# Patient Record
Sex: FEMALE | Race: WHITE | Employment: OTHER | URBAN - METROPOLITAN AREA
[De-identification: names, ages, dates, MRNs, and addresses within clinical notes are randomized per-mention and may not be internally consistent; named-entity substitution may affect disease eponyms.]

---

## 2017-05-23 ENCOUNTER — HOSPITAL ENCOUNTER (EMERGENCY)
Age: 62
Discharge: HOME | End: 2017-05-24
Attending: EMERGENCY MEDICINE | Admitting: EMERGENCY MEDICINE
Payer: COMMERCIAL

## 2017-05-23 ENCOUNTER — APPOINTMENT (OUTPATIENT)
Dept: CT IMAGING | Age: 62
End: 2017-05-23
Attending: EMERGENCY MEDICINE
Payer: COMMERCIAL

## 2017-05-23 ENCOUNTER — APPOINTMENT (OUTPATIENT)
Dept: GENERAL RADIOLOGY | Age: 62
End: 2017-05-23
Attending: EMERGENCY MEDICINE
Payer: COMMERCIAL

## 2017-05-23 DIAGNOSIS — R07.9 ACUTE CHEST PAIN: Primary | ICD-10-CM

## 2017-05-23 DIAGNOSIS — N39.0 URINARY TRACT INFECTION WITHOUT HEMATURIA, SITE UNSPECIFIED: ICD-10-CM

## 2017-05-23 LAB
ALBUMIN SERPL BCP-MCNC: 3.4 G/DL (ref 3.5–5)
ALBUMIN/GLOB SERPL: 0.9 {RATIO} (ref 1.1–2.2)
ALP SERPL-CCNC: 97 U/L (ref 45–117)
ALT SERPL-CCNC: 48 U/L (ref 12–78)
ANION GAP BLD CALC-SCNC: 10 MMOL/L (ref 5–15)
AST SERPL W P-5'-P-CCNC: 21 U/L (ref 15–37)
BASOPHILS # BLD AUTO: 0 K/UL (ref 0–0.1)
BASOPHILS # BLD: 0 % (ref 0–1)
BILIRUB SERPL-MCNC: 0.5 MG/DL (ref 0.2–1)
BUN SERPL-MCNC: 12 MG/DL (ref 6–20)
BUN/CREAT SERPL: 13 (ref 12–20)
CALCIUM SERPL-MCNC: 9.5 MG/DL (ref 8.5–10.1)
CHLORIDE SERPL-SCNC: 106 MMOL/L (ref 97–108)
CK MB CFR SERPL CALC: NORMAL % (ref 0–2.5)
CK MB SERPL-MCNC: <1 NG/ML (ref 5–25)
CK SERPL-CCNC: 61 U/L (ref 26–192)
CO2 SERPL-SCNC: 22 MMOL/L (ref 21–32)
CREAT SERPL-MCNC: 0.93 MG/DL (ref 0.55–1.02)
D DIMER PPP FEU-MCNC: 0.72 MG/L FEU (ref 0–0.65)
EOSINOPHIL # BLD: 0.2 K/UL (ref 0–0.4)
EOSINOPHIL NFR BLD: 1 % (ref 0–7)
ERYTHROCYTE [DISTWIDTH] IN BLOOD BY AUTOMATED COUNT: 13.9 % (ref 11.5–14.5)
GLOBULIN SER CALC-MCNC: 3.9 G/DL (ref 2–4)
GLUCOSE SERPL-MCNC: 141 MG/DL (ref 65–100)
HCT VFR BLD AUTO: 43.7 % (ref 35–47)
HGB BLD-MCNC: 14.4 G/DL (ref 11.5–16)
LYMPHOCYTES # BLD AUTO: 7 % (ref 12–49)
LYMPHOCYTES # BLD: 1.3 K/UL (ref 0.8–3.5)
MCH RBC QN AUTO: 28.5 PG (ref 26–34)
MCHC RBC AUTO-ENTMCNC: 33 G/DL (ref 30–36.5)
MCV RBC AUTO: 86.4 FL (ref 80–99)
MONOCYTES # BLD: 0.3 K/UL (ref 0–1)
MONOCYTES NFR BLD AUTO: 2 % (ref 5–13)
NEUTS SEG # BLD: 16.2 K/UL (ref 1.8–8)
NEUTS SEG NFR BLD AUTO: 90 % (ref 32–75)
PLATELET # BLD AUTO: 228 K/UL (ref 150–400)
POTASSIUM SERPL-SCNC: 4.2 MMOL/L (ref 3.5–5.1)
PROT SERPL-MCNC: 7.3 G/DL (ref 6.4–8.2)
RBC # BLD AUTO: 5.06 M/UL (ref 3.8–5.2)
SODIUM SERPL-SCNC: 138 MMOL/L (ref 136–145)
TROPONIN I SERPL-MCNC: <0.04 NG/ML
WBC # BLD AUTO: 18 K/UL (ref 3.6–11)

## 2017-05-23 PROCEDURE — 80053 COMPREHEN METABOLIC PANEL: CPT | Performed by: EMERGENCY MEDICINE

## 2017-05-23 PROCEDURE — 74011250636 HC RX REV CODE- 250/636: Performed by: EMERGENCY MEDICINE

## 2017-05-23 PROCEDURE — 84484 ASSAY OF TROPONIN QUANT: CPT | Performed by: EMERGENCY MEDICINE

## 2017-05-23 PROCEDURE — 99285 EMERGENCY DEPT VISIT HI MDM: CPT

## 2017-05-23 PROCEDURE — 74011250637 HC RX REV CODE- 250/637: Performed by: EMERGENCY MEDICINE

## 2017-05-23 PROCEDURE — 85025 COMPLETE CBC W/AUTO DIFF WBC: CPT | Performed by: EMERGENCY MEDICINE

## 2017-05-23 PROCEDURE — 85379 FIBRIN DEGRADATION QUANT: CPT | Performed by: EMERGENCY MEDICINE

## 2017-05-23 PROCEDURE — 96361 HYDRATE IV INFUSION ADD-ON: CPT

## 2017-05-23 PROCEDURE — 82550 ASSAY OF CK (CPK): CPT | Performed by: EMERGENCY MEDICINE

## 2017-05-23 PROCEDURE — 71010 XR CHEST PORT: CPT

## 2017-05-23 PROCEDURE — 96375 TX/PRO/DX INJ NEW DRUG ADDON: CPT

## 2017-05-23 PROCEDURE — 36415 COLL VENOUS BLD VENIPUNCTURE: CPT | Performed by: EMERGENCY MEDICINE

## 2017-05-23 PROCEDURE — 93005 ELECTROCARDIOGRAM TRACING: CPT

## 2017-05-23 PROCEDURE — 96374 THER/PROPH/DIAG INJ IV PUSH: CPT

## 2017-05-23 RX ORDER — ALBUTEROL SULFATE 90 UG/1
1 AEROSOL, METERED RESPIRATORY (INHALATION)
COMMUNITY

## 2017-05-23 RX ORDER — METFORMIN HYDROCHLORIDE 1000 MG/1
1000 TABLET ORAL 2 TIMES DAILY
COMMUNITY
End: 2022-09-30

## 2017-05-23 RX ORDER — PREDNISONE 10 MG/1
10 TABLET ORAL DAILY
COMMUNITY
End: 2022-09-30

## 2017-05-23 RX ORDER — BUDESONIDE AND FORMOTEROL FUMARATE DIHYDRATE 160; 4.5 UG/1; UG/1
2 AEROSOL RESPIRATORY (INHALATION) 2 TIMES DAILY
COMMUNITY
End: 2022-09-30

## 2017-05-23 RX ORDER — GUAIFENESIN 100 MG/5ML
81 LIQUID (ML) ORAL DAILY
COMMUNITY

## 2017-05-23 RX ORDER — ATORVASTATIN CALCIUM 40 MG/1
40 TABLET, FILM COATED ORAL DAILY
COMMUNITY

## 2017-05-23 RX ORDER — MORPHINE SULFATE 2 MG/ML
4 INJECTION, SOLUTION INTRAMUSCULAR; INTRAVENOUS
Status: COMPLETED | OUTPATIENT
Start: 2017-05-23 | End: 2017-05-23

## 2017-05-23 RX ORDER — DIAZEPAM 5 MG/1
5 TABLET ORAL
Status: COMPLETED | OUTPATIENT
Start: 2017-05-23 | End: 2017-05-23

## 2017-05-23 RX ORDER — ONDANSETRON 2 MG/ML
4 INJECTION INTRAMUSCULAR; INTRAVENOUS
Status: COMPLETED | OUTPATIENT
Start: 2017-05-23 | End: 2017-05-23

## 2017-05-23 RX ORDER — IPRATROPIUM BROMIDE AND ALBUTEROL SULFATE 2.5; .5 MG/3ML; MG/3ML
3 SOLUTION RESPIRATORY (INHALATION)
COMMUNITY
End: 2022-09-30

## 2017-05-23 RX ORDER — SODIUM CHLORIDE 0.9 % (FLUSH) 0.9 %
10 SYRINGE (ML) INJECTION
Status: DISCONTINUED | OUTPATIENT
Start: 2017-05-23 | End: 2017-05-24

## 2017-05-23 RX ORDER — OMEPRAZOLE 20 MG/1
20 CAPSULE, DELAYED RELEASE ORAL DAILY
COMMUNITY

## 2017-05-23 RX ORDER — SODIUM CHLORIDE 9 MG/ML
50 INJECTION, SOLUTION INTRAVENOUS
Status: DISCONTINUED | OUTPATIENT
Start: 2017-05-23 | End: 2017-05-24

## 2017-05-23 RX ORDER — METOPROLOL TARTRATE 25 MG/1
25 TABLET, FILM COATED ORAL 2 TIMES DAILY
COMMUNITY

## 2017-05-23 RX ADMIN — Medication 4 MG: at 22:00

## 2017-05-23 RX ADMIN — DIAZEPAM 5 MG: 5 TABLET ORAL at 21:24

## 2017-05-23 RX ADMIN — ONDANSETRON HYDROCHLORIDE 4 MG: 2 INJECTION, SOLUTION INTRAMUSCULAR; INTRAVENOUS at 22:00

## 2017-05-23 RX ADMIN — SODIUM CHLORIDE 1000 ML: 900 INJECTION, SOLUTION INTRAVENOUS at 22:00

## 2017-05-24 ENCOUNTER — APPOINTMENT (OUTPATIENT)
Dept: NUCLEAR MEDICINE | Age: 62
End: 2017-05-24
Attending: EMERGENCY MEDICINE
Payer: COMMERCIAL

## 2017-05-24 VITALS
HEART RATE: 81 BPM | BODY MASS INDEX: 42.69 KG/M2 | OXYGEN SATURATION: 95 % | HEIGHT: 62 IN | DIASTOLIC BLOOD PRESSURE: 70 MMHG | SYSTOLIC BLOOD PRESSURE: 126 MMHG | WEIGHT: 232 LBS | RESPIRATION RATE: 20 BRPM | TEMPERATURE: 97.8 F

## 2017-05-24 LAB
APPEARANCE UR: ABNORMAL
ATRIAL RATE: 115 BPM
BACTERIA URNS QL MICRO: ABNORMAL /HPF
BILIRUB UR QL: NEGATIVE
CALCULATED P AXIS, ECG09: 36 DEGREES
CALCULATED R AXIS, ECG10: -35 DEGREES
CALCULATED T AXIS, ECG11: 84 DEGREES
COLOR UR: ABNORMAL
DIAGNOSIS, 93000: NORMAL
EPITH CASTS URNS QL MICRO: ABNORMAL /LPF
GLUCOSE UR STRIP.AUTO-MCNC: NEGATIVE MG/DL
HGB UR QL STRIP: ABNORMAL
KETONES UR QL STRIP.AUTO: NEGATIVE MG/DL
LEUKOCYTE ESTERASE UR QL STRIP.AUTO: ABNORMAL
NITRITE UR QL STRIP.AUTO: POSITIVE
P-R INTERVAL, ECG05: 152 MS
PH UR STRIP: 5 [PH] (ref 5–8)
PROT UR STRIP-MCNC: 100 MG/DL
Q-T INTERVAL, ECG07: 316 MS
QRS DURATION, ECG06: 62 MS
QTC CALCULATION (BEZET), ECG08: 437 MS
RBC #/AREA URNS HPF: ABNORMAL /HPF (ref 0–5)
SP GR UR REFRACTOMETRY: 1.01 (ref 1–1.03)
TROPONIN I SERPL-MCNC: 0.04 NG/ML
UROBILINOGEN UR QL STRIP.AUTO: 0.2 EU/DL (ref 0.2–1)
VENTRICULAR RATE, ECG03: 115 BPM
WBC URNS QL MICRO: >100 /HPF (ref 0–4)

## 2017-05-24 PROCEDURE — 81001 URINALYSIS AUTO W/SCOPE: CPT | Performed by: EMERGENCY MEDICINE

## 2017-05-24 PROCEDURE — 84484 ASSAY OF TROPONIN QUANT: CPT | Performed by: EMERGENCY MEDICINE

## 2017-05-24 PROCEDURE — A9558 XE133 XENON 10MCI: HCPCS

## 2017-05-24 PROCEDURE — 74011250636 HC RX REV CODE- 250/636

## 2017-05-24 RX ORDER — CEPHALEXIN 250 MG/1
500 CAPSULE ORAL
Status: DISCONTINUED | OUTPATIENT
Start: 2017-05-24 | End: 2017-05-24 | Stop reason: HOSPADM

## 2017-05-24 RX ORDER — CEPHALEXIN 500 MG/1
500 CAPSULE ORAL 4 TIMES DAILY
Qty: 28 CAP | Refills: 0 | Status: SHIPPED
Start: 2017-05-24 | End: 2017-05-31

## 2017-05-24 RX ORDER — MORPHINE SULFATE 2 MG/ML
INJECTION, SOLUTION INTRAMUSCULAR; INTRAVENOUS
Status: DISCONTINUED
Start: 2017-05-24 | End: 2017-05-24 | Stop reason: HOSPADM

## 2017-05-24 RX ORDER — HYDROCODONE BITARTRATE AND ACETAMINOPHEN 7.5; 325 MG/1; MG/1
1 TABLET ORAL
Qty: 20 TAB | Refills: 0 | Status: SHIPPED
Start: 2017-05-24 | End: 2022-09-30

## 2017-05-24 RX ORDER — CEPHALEXIN 250 MG/1
CAPSULE ORAL
Status: DISCONTINUED
Start: 2017-05-24 | End: 2017-05-24 | Stop reason: HOSPADM

## 2017-05-24 NOTE — ED NOTES
Unable to obtain peripheral IV in antecubital area. Patient limited to 1 arm due to history of right radical mastectomy. Dr. Lang Gone notified.

## 2017-05-24 NOTE — ED TRIAGE NOTES
Chief Complaint: Back pain/Chest pain/Shortness of breath  Patient states \"was watching TV and started experiencing back pain between shoulder blades. Associated with shortness of breath. \"  \"Pressure on EMS arrival. Like someone sitting on chest.  Given sublingual nitroglycerin that eased the back pain but the chest pain remains. \"  Onset 2000  Pt arrived via [address]    Associated with nausea after took 2 aspirin prior to arrival.    EMS gave 2 additional aspirin and 3 sublingual nitroglycerin. History of MI x 1 and COPD.

## 2017-05-24 NOTE — ED PROVIDER NOTES
HPI Comments: Oswaldo Lewis is a 60 y.o. female s/p stent placement following an MI in 2010 with PMHx of COPD / CAD / breast CA / DM who presents via EMS to the ED c/o acute onset of diffuse back pain x 8:00 PM tonight while watching television. Pt complains of associated tremors, pressure-like chest pain, SOB, and nausea. She notes that her back pain began abruptly and her other symptoms followed shortly after onset of her back pain. Per EMS, pt took 2 tablets of ASA prior to EMS arrival and was given 2 more tablets en route to the ED. EMS states that the pt was also given a total of 3 nitroglycerin tablets en route. Pt states that the nitroglycerin tablets partially alleviated her back pain but did not reduce her chest pain. She notes that she experienced CP and L arm pain during her MI in 2010. Pt specifically denies L arm pain, wheezing, leg swelling, recent travel, hormone therapy, vomiting, or hx of clots. Social hx: - Tobacco use (former - 2011), + EtOH use (2 beers per night), - Illicit drug use    PCP: Dr. Rosie Cornelius  Cardiologist: Dr. Frieda Knight    There are no other complaints, changes or physical findings at this time. The history is provided by the patient and the EMS personnel. No  was used. Past Medical History:   Diagnosis Date    CAD (coronary artery disease)     Cancer ([address]     breast cancer with right radical mastectomy 2008    Chronic obstructive pulmonary disease ([address]     Diabetes ([address]     Gastrointestinal disorder     heart burn    Sleep disorder     uses CPAP at night       Past Surgical History:   Procedure Laterality Date    BREAST SURGERY PROCEDURE UNLISTED      right radical mastectomy    CARDIAC SURG PROCEDURE UNLIST      1 cardiac stent 2010         History reviewed. No pertinent family history.     Social History     Social History    Marital status:      Spouse name: N/A    Number of children: N/A    Years of education: N/A     Occupational History    Not on file. Social History Main Topics    Smoking status: Former Smoker     Quit date: 1/1/2011    Smokeless tobacco: Not on file    Alcohol use Yes      Comment: 2 beers per night    Drug use: No    Sexual activity: Not on file     Other Topics Concern    Not on file     Social History Narrative    No narrative on file         ALLERGIES: Codeine    Review of Systems   Constitutional: Negative for activity change, chills and fever. HENT: Negative for congestion and sore throat. Eyes: Negative for pain and redness. Respiratory: Positive for shortness of breath. Negative for cough, chest tightness and wheezing. Cardiovascular: Positive for chest pain. Negative for palpitations and leg swelling. Gastrointestinal: Positive for nausea. Negative for abdominal pain, diarrhea and vomiting. Genitourinary: Negative for dysuria, frequency and urgency. Musculoskeletal: Positive for back pain. Negative for neck pain. - L arm pain   Skin: Negative for rash. Neurological: Positive for tremors. Negative for syncope, light-headedness and headaches. Psychiatric/Behavioral: Negative for confusion. All other systems reviewed and are negative. Patient Vitals for the past 12 hrs:   Temp Pulse Resp BP SpO2   05/24/17 0300 - 81 22 126/70 95 %   05/24/17 0230 - 82 17 126/65 99 %   05/24/17 0200 - 84 25 112/61 97 %   05/24/17 0030 - 96 18 108/65 95 %   05/24/17 0000 - 98 19 111/72 97 %   05/23/17 2330 - (!) 101 17 104/54 96 %   05/23/17 2300 - 99 14 96/59 98 %   05/23/17 2230 - 98 13 108/57 97 %   05/23/17 2215 - 98 15 111/61 97 %   05/23/17 2145 - - - - 94 %   05/23/17 2130 - (!) 108 20 - 99 %   05/23/17 2100 - (!) 106 24 140/56 98 %   05/23/17 2045 99.9 °F (37.7 °C) (!) 116 19 122/59 98 %       Physical Exam   Constitutional: She is oriented to person, place, and time. She appears well-developed and well-nourished. No distress.    Obese   HENT:   Head: Normocephalic. Nose: Nose normal.   Mouth/Throat: Oropharynx is clear and moist. No oropharyngeal exudate. Eyes: Conjunctivae are normal. Pupils are equal, round, and reactive to light. No scleral icterus. Neck: Normal range of motion. Neck supple. No JVD present. No tracheal deviation present. No thyromegaly present. Cardiovascular: Regular rhythm and intact distal pulses. Exam reveals no gallop and no friction rub. No murmur heard. Tachycardic   Pulmonary/Chest: Effort normal and breath sounds normal. No stridor. No respiratory distress. She has no wheezes. She has no rales. Abdominal: Soft. Bowel sounds are normal. She exhibits no distension. There is no tenderness. There is no rebound and no guarding. Musculoskeletal: Normal range of motion. She exhibits no edema. Lymphadenopathy:     She has no cervical adenopathy. Neurological: She is alert and oriented to person, place, and time. No cranial nerve deficit. She exhibits normal muscle tone. Coordination normal.   Skin: Skin is warm and dry. No rash noted. She is not diaphoretic. No erythema. Psychiatric: She has a normal mood and affect. Her behavior is normal.   Nursing note and vitals reviewed. MDM  Number of Diagnoses or Management Options  Acute chest pain:   Urinary tract infection without hematuria, site unspecified:   Diagnosis management comments: DDx: ACS, COPD, anxiety       Amount and/or Complexity of Data Reviewed  Clinical lab tests: ordered and reviewed  Tests in the radiology section of CPT®: ordered and reviewed  Tests in the medicine section of CPT®: ordered and reviewed  Obtain history from someone other than the patient: yes (EMS personnel)  Review and summarize past medical records: yes  Independent visualization of images, tracings, or specimens: yes    Patient Progress  Patient progress: stable    ED Course     Procedures     ED EKG interpretation: 20:38  Rhythm: sinus tachycardia; and regular .  Rate (approx.): 115; Axis: left axis deviation; VA Interval: normal; QRS interval: normal ; ST/T wave: non-specific changes; This EKG was interpreted by Jarod Gavin MD,ED Provider. SIGN OUT:  10:08 PM  Patient's presentation, labs/imaging and plan of care was reviewed with Gregor Sicard, MD as part of sign out. They will dispo the pt as seen best fit as part of the plan discussed with the patient. Gregor Sicard, MD's assistance in completion of this plan is greatly appreciated but it should be noted that I will be the provider of record for this patient. Jarod Gavin MD    This note is prepared by Josué Garcia, acting as Scribe for Jarod Gavin MD.    Jarod Gavin MD: The scribe's documentation has been prepared under my direction and personally reviewed by me in its entirety. I confirm that the note above accurately reflects all work, treatment, procedures, and medical decision making performed by me. PROGRESS NOTE:  11:34 PM  Pt has been re-evaluated. The pt was unable to have a CT done noting the pt was unable to get adequate IV access. Will obtain VQ scan at this time. Written by Blair Cain, ED Scribe, as dictated by Gregor Sicard, MD.     PROGRESS NOTE:  2:01 AM  Pt has been re-evaluated. The pt will get Keflex for her UTI and will be treated with Morphine for her pain at this time. Written by Blair Cain, ED Scribe, as dictated by Gregor Sicard, MD.     Pt noted to receive care in the ED during monthly downtime. Imaging and lab work may not be readily available in EMR. Hard copy print outs of labs have been reviewed, imaging results (via \"sticky notes\" from Radiologist) have also been reviewed. Gregor Sicard, MD     Progress note:  Pt noted to be feeling better, ready for discharge. Discussed lab and imaging findings with pt and/or family. Will follow up as instructed. All questions have been answered, pt voiced understanding and agreement with plan.      If narcotics were prescribed, pt was advised not to drive or operate heavy machinery. If abx were prescribed, pt advised that diarrhea and rash are possible side effects of the medications. Specific return precautions provided as well as instructions to return to the ED should sx worsen at any time. Uma Pierre MD      LABORATORY TESTS:  Recent Results (from the past 12 hour(s))   EKG, 12 LEAD, INITIAL    Collection Time: 05/23/17  8:38 PM   Result Value Ref Range    Ventricular Rate 115 BPM    Atrial Rate 115 BPM    P-R Interval 152 ms    QRS Duration 62 ms    Q-T Interval 316 ms    QTC Calculation (Bezet) 437 ms    Calculated P Axis 36 degrees    Calculated R Axis -35 degrees    Calculated T Axis 84 degrees    Diagnosis       Sinus tachycardia  Left axis deviation  Cannot rule out Anterior infarct , age undetermined  Abnormal ECG  No previous ECGs available     CBC WITH AUTOMATED DIFF    Collection Time: 05/23/17  9:23 PM   Result Value Ref Range    WBC 18.0 (H) 3.6 - 11.0 K/uL    RBC 5.06 3.80 - 5.20 M/uL    HGB 14.4 11.5 - 16.0 g/dL    HCT 43.7 35.0 - 47.0 %    MCV 86.4 80.0 - 99.0 FL    MCH 28.5 26.0 - 34.0 PG    MCHC 33.0 30.0 - 36.5 g/dL    RDW 13.9 11.5 - 14.5 %    PLATELET 272 012 - 232 K/uL    NEUTROPHILS 90 (H) 32 - 75 %    LYMPHOCYTES 7 (L) 12 - 49 %    MONOCYTES 2 (L) 5 - 13 %    EOSINOPHILS 1 0 - 7 %    BASOPHILS 0 0 - 1 %    ABS. NEUTROPHILS 16.2 (H) 1.8 - 8.0 K/UL    ABS. LYMPHOCYTES 1.3 0.8 - 3.5 K/UL    ABS. MONOCYTES 0.3 0.0 - 1.0 K/UL    ABS. EOSINOPHILS 0.2 0.0 - 0.4 K/UL    ABS.  BASOPHILS 0.0 0.0 - 0.1 K/UL   METABOLIC PANEL, COMPREHENSIVE    Collection Time: 05/23/17  9:23 PM   Result Value Ref Range    Sodium 138 136 - 145 mmol/L    Potassium 4.2 3.5 - 5.1 mmol/L    Chloride 106 97 - 108 mmol/L    CO2 22 21 - 32 mmol/L    Anion gap 10 5 - 15 mmol/L    Glucose 141 (H) 65 - 100 mg/dL    BUN 12 6 - 20 MG/DL    Creatinine 0.93 0.55 - 1.02 MG/DL    BUN/Creatinine ratio 13 12 - 20      GFR est AA >60 >60 ml/min/1.73m2    GFR est non-AA >60 >60 ml/min/1.73m2    Calcium 9.5 8.5 - 10.1 MG/DL    Bilirubin, total 0.5 0.2 - 1.0 MG/DL    ALT (SGPT) 48 12 - 78 U/L    AST (SGOT) 21 15 - 37 U/L    Alk. phosphatase 97 45 - 117 U/L    Protein, total 7.3 6.4 - 8.2 g/dL    Albumin 3.4 (L) 3.5 - 5.0 g/dL    Globulin 3.9 2.0 - 4.0 g/dL    A-G Ratio 0.9 (L) 1.1 - 2.2     CK W/ CKMB & INDEX    Collection Time: 05/23/17  9:23 PM   Result Value Ref Range    CK 61 26 - 192 U/L    CK - MB <1.0 <3.6 NG/ML    CK-MB Index Cannot be calulated 0 - 2.5     TROPONIN I    Collection Time: 05/23/17  9:23 PM   Result Value Ref Range    Troponin-I, Qt. <0.04 <0.05 ng/mL   D DIMER    Collection Time: 05/23/17  9:57 PM   Result Value Ref Range    D-dimer 0.72 (H) 0.00 - 0.65 mg/L FEU   TROPONIN I    Collection Time: 05/24/17 12:02 AM   Result Value Ref Range    Troponin-I, Qt. 0.04 <0.05 ng/mL   URINALYSIS W/MICROSCOPIC    Collection Time: 05/24/17 12:11 AM   Result Value Ref Range    Color YELLOW/STRAW      Appearance TURBID (A) CLEAR      Specific gravity 1.013 1.003 - 1.030      pH (UA) 5.0 5.0 - 8.0      Protein 100 (A) NEG mg/dL    Glucose NEGATIVE  NEG mg/dL    Ketone NEGATIVE  NEG mg/dL    Bilirubin NEGATIVE  NEG      Blood LARGE (A) NEG      Urobilinogen 0.2 0.2 - 1.0 EU/dL    Nitrites POSITIVE (A) NEG      Leukocyte Esterase LARGE (A) NEG      WBC >100 (H) 0 - 4 /hpf    RBC 20-50 0 - 5 /hpf    Epithelial cells FEW FEW /lpf    Bacteria 1+ (A) NEG /hpf       IMAGING RESULTS:              NM LUNG VENT/PERF    (Preliminary results from radiology sticker)  :\"very low probability for acute pulmonary embolus. \"     CXR Results  (Last 48 hours)               05/23/17 2114  XR CHEST PORT Final result    Impression:  IMPRESSION:   No acute finding       Narrative:  INDICATION: Lower mid back and chest pain with tremors       COMPARISON: None       A single frontal view was obtained. The time of this study is 2058 hours. Lungs   are clear.  Heart size is at the upper limits of normal for this type of   projection. Mediastinal shadows are normal.. MEDICATIONS GIVEN:  Medications   0.9% sodium chloride infusion (not administered)   iopamidol (ISOVUE-370) 76 % injection 100 mL (not administered)   sodium chloride (NS) flush 10 mL (not administered)   diazePAM (VALIUM) tablet 5 mg (5 mg Oral Given 5/23/17 2124)   morphine injection 4 mg (4 mg IntraVENous Given 5/23/17 2200)   ondansetron (ZOFRAN) injection 4 mg (4 mg IntraVENous Given 5/23/17 2200)   sodium chloride 0.9 % bolus infusion 1,000 mL (1,000 mL IntraVENous New Bag 5/23/17 2200)       IMPRESSION:  1. Acute chest pain    2. Urinary tract infection without hematuria, site unspecified        PLAN:  1. Current Discharge Medication List        2. Follow-up Information     Follow up With Details Comments 81 Julianne Darnell MD Schedule an appointment as soon as possible for a visit in 1 day  [address],  [address] [facility] [address]  [phone]      [facility] EMERGENCY DEPT Go in 1 day If symptoms worsen [address]  [address]   [address] [address] [place]        3. Return to ED if worse  Discharge Note:  2:50 AM  The patient is ready for discharge. The patient's signs, symptoms, diagnosis, and discharge instruction have been discussed and the patient has conveyed their understanding. The patient is to follow up as recommended or return to the ER should their symptoms worsen. Plan has been discussed and the patient is in agreement. Written by Lyndon Cain ED Scribe, as dictated by Vernadine Leyden, MD     Attestation: This note is prepared by Brooklynn Bloom. Ant, acting as Scribe for Vernadine Leyden, MD.      Vernadine Leyden, MD: The scribe's documentation has been prepared under my direction and personally reviewed by me in its entirety.  I confirm that the note above accurately reflects all work, treatment, procedures, and medical decision making performed by me. This note will not be viewable in [address]

## 2017-05-24 NOTE — DISCHARGE INSTRUCTIONS
Urinary Tract Infection in Women: Care Instructions  Your Care Instructions    A urinary tract infection, or UTI, is a general term for an infection anywhere between the kidneys and the urethra (where urine comes out). Most UTIs are bladder infections. They often cause pain or burning when you urinate. UTIs are caused by bacteria and can be cured with antibiotics. Be sure to complete your treatment so that the infection goes away. Follow-up care is a key part of your treatment and safety. Be sure to make and go to all appointments, and call your doctor if you are having problems. It's also a good idea to know your test results and keep a list of the medicines you take. How can you care for yourself at home? · Take your antibiotics as directed. Do not stop taking them just because you feel better. You need to take the full course of antibiotics. · Drink extra water and other fluids for the next day or two. This may help wash out the bacteria that are causing the infection. (If you have kidney, heart, or liver disease and have to limit fluids, talk with your doctor before you increase your fluid intake.)  · Avoid drinks that are carbonated or have caffeine. They can irritate the bladder. · Urinate often. Try to empty your bladder each time. · To relieve pain, take a hot bath or lay a heating pad set on low over your lower belly or genital area. Never go to sleep with a heating pad in place. To prevent UTIs  · Drink plenty of water each day. This helps you urinate often, which clears bacteria from your system. (If you have kidney, heart, or liver disease and have to limit fluids, talk with your doctor before you increase your fluid intake.)  · Urinate when you need to. · Urinate right after you have sex. · Change sanitary pads often. · Avoid douches, bubble baths, feminine hygiene sprays, and other feminine hygiene products that have deodorants.   · After going to the bathroom, wipe from front to back.  When should you call for help? Call your doctor now or seek immediate medical care if:  · Symptoms such as fever, chills, nausea, or vomiting get worse or appear for the first time. · You have new pain in your back just below your rib cage. This is called flank pain. · There is new blood or pus in your urine. · You have any problems with your antibiotic medicine. Watch closely for changes in your health, and be sure to contact your doctor if:  · You are not getting better after taking an antibiotic for 2 days. · Your symptoms go away but then come back. Where can you learn more? Go to http://vivian-natalia.info/. Enter I203 in the search box to learn more about \"Urinary Tract Infection in Women: Care Instructions. \"  Current as of: November 28, 2016  Content Version: 11.2  © 8865-1993 [employer]. Care instructions adapted under license by [facility] (which disclaims liability or warranty for this information). If you have questions about a medical condition or this instruction, always ask your healthcare professional. [address] any warranty or liability for your use of this information. Chest Pain: Care Instructions  Your Care Instructions  There are many things that can cause chest pain. Some are not serious and will get better on their own in a few days. But some kinds of chest pain need more testing and treatment. Your doctor may have recommended a follow-up visit in the next 8 to 12 hours. If you are not getting better, you may need more tests or treatment. Even though your doctor has released you, you still need to watch for any problems. The doctor carefully checked you, but sometimes problems can develop later. If you have new symptoms or if your symptoms do not get better, get medical care right away.   If you have worse or different chest pain or pressure that lasts more than 5 minutes or you passed out (lost consciousness), call 911 or seek other emergency help right away. A medical visit is only one step in your treatment. Even if you feel better, you still need to do what your doctor recommends, such as going to all suggested follow-up appointments and taking medicines exactly as directed. This will help you recover and help prevent future problems. How can you care for yourself at home? · Rest until you feel better. · Take your medicine exactly as prescribed. Call your doctor if you think you are having a problem with your medicine. · Do not drive after taking a prescription pain medicine. When should you call for help? Call 911 if:  · You passed out (lost consciousness). · You have severe difficulty breathing. · You have symptoms of a heart attack. These may include:  ¨ Chest pain or pressure, or a strange feeling in your chest.  ¨ Sweating. ¨ Shortness of breath. ¨ Nausea or vomiting. ¨ Pain, pressure, or a strange feeling in your back, neck, jaw, or upper belly or in one or both shoulders or arms. ¨ Lightheadedness or sudden weakness. ¨ A fast or irregular heartbeat. After you call 911, the  may tell you to chew 1 adult-strength or 2 to 4 low-dose aspirin. Wait for an ambulance. Do not try to drive yourself. Call your doctor today if:  · You have any trouble breathing. · Your chest pain gets worse. · You are dizzy or lightheaded, or you feel like you may faint. · You are not getting better as expected. · You are having new or different chest pain. Where can you learn more? Go to http://vivian-natalia.info/. Enter A120 in the search box to learn more about \"Chest Pain: Care Instructions. \"  Current as of: May 27, 2016  Content Version: 11.2  © 7967-4535 [employer]. Care instructions adapted under license by [employer] (which disclaims liability or warranty for this information).  If you have questions about a medical condition or this instruction, always ask your healthcare professional. [place] [address] any warranty or liability for your use of this information.

## 2017-05-24 NOTE — ED NOTES
The documentation for this period is being entered following the guidelines as defined in the [facility] downtime policy by Xavier Osullivan RN. Downtime started at 0100. Please see downtime documentation for discharge and medications during downtime.

## 2017-05-24 NOTE — ED NOTES
Pain in chest alleviated. Pain in upper back 1/10 at this time after Morphine IV. No shortness of breath at this time.

## 2020-11-28 ENCOUNTER — HOSPITAL ENCOUNTER (INPATIENT)
Age: 65
LOS: 3 days | Discharge: HOME | DRG: 177 | End: 2020-12-02
Attending: EMERGENCY MEDICINE | Admitting: INTERNAL MEDICINE
Payer: MEDICARE

## 2020-11-28 ENCOUNTER — APPOINTMENT (OUTPATIENT)
Dept: GENERAL RADIOLOGY | Age: 65
DRG: 177 | End: 2020-11-28
Attending: EMERGENCY MEDICINE
Payer: MEDICARE

## 2020-11-28 DIAGNOSIS — U07.1 ACUTE RESPIRATORY FAILURE DUE TO COVID-19 (HCC): Primary | ICD-10-CM

## 2020-11-28 DIAGNOSIS — R91.8 BILATERAL PULMONARY INFILTRATES ON CHEST X-RAY: ICD-10-CM

## 2020-11-28 DIAGNOSIS — I10 ESSENTIAL HYPERTENSION: ICD-10-CM

## 2020-11-28 DIAGNOSIS — U07.1 LAB TEST POSITIVE FOR DETECTION OF COVID-19 VIRUS: ICD-10-CM

## 2020-11-28 DIAGNOSIS — Z86.000 H/O CARCINOMA IN SITU OF BREAST: ICD-10-CM

## 2020-11-28 DIAGNOSIS — J12.82 PNEUMONIA DUE TO COVID-19 VIRUS: ICD-10-CM

## 2020-11-28 DIAGNOSIS — U07.1 PNEUMONIA DUE TO COVID-19 VIRUS: ICD-10-CM

## 2020-11-28 DIAGNOSIS — R09.02 HYPOXIA: ICD-10-CM

## 2020-11-28 DIAGNOSIS — G47.33 OSA (OBSTRUCTIVE SLEEP APNEA): ICD-10-CM

## 2020-11-28 DIAGNOSIS — J96.00 ACUTE RESPIRATORY FAILURE DUE TO COVID-19 (HCC): Primary | ICD-10-CM

## 2020-11-28 LAB
ALBUMIN SERPL-MCNC: 2.9 G/DL (ref 3.5–5)
ALBUMIN/GLOB SERPL: 0.7 {RATIO} (ref 1.1–2.2)
ALP SERPL-CCNC: 102 U/L (ref 45–117)
ALT SERPL-CCNC: 55 U/L (ref 12–78)
ANION GAP SERPL CALC-SCNC: 9 MMOL/L (ref 5–15)
AST SERPL-CCNC: 49 U/L (ref 15–37)
BASOPHILS # BLD: 0 K/UL (ref 0–0.1)
BASOPHILS NFR BLD: 0 % (ref 0–1)
BILIRUB SERPL-MCNC: 0.5 MG/DL (ref 0.2–1)
BUN SERPL-MCNC: 13 MG/DL (ref 6–20)
BUN/CREAT SERPL: 11 (ref 12–20)
CALCIUM SERPL-MCNC: 8.9 MG/DL (ref 8.5–10.1)
CHLORIDE SERPL-SCNC: 103 MMOL/L (ref 97–108)
CO2 SERPL-SCNC: 23 MMOL/L (ref 21–32)
CREAT SERPL-MCNC: 1.16 MG/DL (ref 0.55–1.02)
DIFFERENTIAL METHOD BLD: ABNORMAL
EOSINOPHIL # BLD: 0 K/UL (ref 0–0.4)
EOSINOPHIL NFR BLD: 0 % (ref 0–7)
ERYTHROCYTE [DISTWIDTH] IN BLOOD BY AUTOMATED COUNT: 14.8 % (ref 11.5–14.5)
GLOBULIN SER CALC-MCNC: 4.1 G/DL (ref 2–4)
GLUCOSE SERPL-MCNC: 208 MG/DL (ref 65–100)
HCT VFR BLD AUTO: 47.3 % (ref 35–47)
HGB BLD-MCNC: 14.9 G/DL (ref 11.5–16)
IMM GRANULOCYTES # BLD AUTO: 0 K/UL (ref 0–0.04)
IMM GRANULOCYTES NFR BLD AUTO: 0 % (ref 0–0.5)
LYMPHOCYTES # BLD: 1.3 K/UL (ref 0.8–3.5)
LYMPHOCYTES NFR BLD: 17 % (ref 12–49)
MCH RBC QN AUTO: 26.5 PG (ref 26–34)
MCHC RBC AUTO-ENTMCNC: 31.5 G/DL (ref 30–36.5)
MCV RBC AUTO: 84 FL (ref 80–99)
MONOCYTES # BLD: 0.7 K/UL (ref 0–1)
MONOCYTES NFR BLD: 9 % (ref 5–13)
NEUTS SEG # BLD: 5.6 K/UL (ref 1.8–8)
NEUTS SEG NFR BLD: 74 % (ref 32–75)
NRBC # BLD: 0 K/UL (ref 0–0.01)
NRBC BLD-RTO: 0 PER 100 WBC
PLATELET # BLD AUTO: 162 K/UL (ref 150–400)
PMV BLD AUTO: 11.8 FL (ref 8.9–12.9)
POTASSIUM SERPL-SCNC: 3.5 MMOL/L (ref 3.5–5.1)
PROT SERPL-MCNC: 7 G/DL (ref 6.4–8.2)
RBC # BLD AUTO: 5.63 M/UL (ref 3.8–5.2)
SODIUM SERPL-SCNC: 135 MMOL/L (ref 136–145)
TROPONIN I SERPL-MCNC: <0.05 NG/ML
WBC # BLD AUTO: 7.7 K/UL (ref 3.6–11)

## 2020-11-28 PROCEDURE — 93005 ELECTROCARDIOGRAM TRACING: CPT

## 2020-11-28 PROCEDURE — 74011250636 HC RX REV CODE- 250/636: Performed by: EMERGENCY MEDICINE

## 2020-11-28 PROCEDURE — 84484 ASSAY OF TROPONIN QUANT: CPT

## 2020-11-28 PROCEDURE — 80053 COMPREHEN METABOLIC PANEL: CPT

## 2020-11-28 PROCEDURE — 74011250637 HC RX REV CODE- 250/637: Performed by: EMERGENCY MEDICINE

## 2020-11-28 PROCEDURE — 36415 COLL VENOUS BLD VENIPUNCTURE: CPT

## 2020-11-28 PROCEDURE — 71045 X-RAY EXAM CHEST 1 VIEW: CPT

## 2020-11-28 PROCEDURE — 85025 COMPLETE CBC W/AUTO DIFF WBC: CPT

## 2020-11-28 PROCEDURE — 99285 EMERGENCY DEPT VISIT HI MDM: CPT

## 2020-11-28 RX ORDER — BENZONATATE 100 MG/1
100 CAPSULE ORAL
Status: COMPLETED | OUTPATIENT
Start: 2020-11-28 | End: 2020-11-28

## 2020-11-28 RX ORDER — DOXYCYCLINE HYCLATE 100 MG
100 TABLET ORAL
Status: COMPLETED | OUTPATIENT
Start: 2020-11-28 | End: 2020-11-28

## 2020-11-28 RX ORDER — ALBUTEROL SULFATE 90 UG/1
4 AEROSOL, METERED RESPIRATORY (INHALATION)
Status: COMPLETED | OUTPATIENT
Start: 2020-11-28 | End: 2020-11-28

## 2020-11-28 RX ORDER — KETOROLAC TROMETHAMINE 30 MG/ML
30 INJECTION, SOLUTION INTRAMUSCULAR; INTRAVENOUS
Status: DISCONTINUED | OUTPATIENT
Start: 2020-11-28 | End: 2020-11-28

## 2020-11-28 RX ORDER — KETOROLAC TROMETHAMINE 30 MG/ML
15 INJECTION, SOLUTION INTRAMUSCULAR; INTRAVENOUS
Status: COMPLETED | OUTPATIENT
Start: 2020-11-28 | End: 2020-11-28

## 2020-11-28 RX ADMIN — ALBUTEROL SULFATE 4 PUFF: 90 AEROSOL, METERED RESPIRATORY (INHALATION) at 23:00

## 2020-11-28 RX ADMIN — KETOROLAC TROMETHAMINE 15 MG: 30 INJECTION, SOLUTION INTRAMUSCULAR; INTRAVENOUS at 22:16

## 2020-11-28 RX ADMIN — BENZONATATE 100 MG: 100 CAPSULE ORAL at 23:01

## 2020-11-28 RX ADMIN — DOXYCYCLINE HYCLATE 100 MG: 100 TABLET, COATED ORAL at 23:01

## 2020-11-29 ENCOUNTER — APPOINTMENT (OUTPATIENT)
Dept: CT IMAGING | Age: 65
DRG: 177 | End: 2020-11-29
Attending: EMERGENCY MEDICINE
Payer: MEDICARE

## 2020-11-29 PROBLEM — E78.5 HYPERLIPIDEMIA: Status: ACTIVE | Noted: 2020-11-29

## 2020-11-29 PROBLEM — I10 HTN (HYPERTENSION): Status: ACTIVE | Noted: 2020-11-29

## 2020-11-29 PROBLEM — J12.82 PNEUMONIA DUE TO COVID-19 VIRUS: Status: ACTIVE | Noted: 2020-11-29

## 2020-11-29 PROBLEM — E11.9 TYPE II DIABETES MELLITUS (HCC): Status: ACTIVE | Noted: 2020-11-29

## 2020-11-29 PROBLEM — K21.9 GERD (GASTROESOPHAGEAL REFLUX DISEASE): Status: ACTIVE | Noted: 2020-11-29

## 2020-11-29 PROBLEM — U07.1 PNEUMONIA DUE TO COVID-19 VIRUS: Status: ACTIVE | Noted: 2020-11-29

## 2020-11-29 PROBLEM — R09.02 HYPOXIA: Status: ACTIVE | Noted: 2020-11-29

## 2020-11-29 LAB
APTT PPP: 25.2 SEC (ref 22.1–31)
ATRIAL RATE: 93 BPM
CALCULATED P AXIS, ECG09: 52 DEGREES
CALCULATED R AXIS, ECG10: -22 DEGREES
CALCULATED T AXIS, ECG11: 45 DEGREES
COMMENT, HOLDF: NORMAL
COVID-19 RAPID TEST, COVR: DETECTED
CRP SERPL-MCNC: 5.45 MG/DL (ref 0–0.6)
DIAGNOSIS, 93000: NORMAL
FERRITIN SERPL-MCNC: 360 NG/ML (ref 8–252)
FERRITIN SERPL-MCNC: 368 NG/ML (ref 8–252)
FIBRINOGEN PPP-MCNC: 572 MG/DL (ref 200–475)
GLUCOSE BLD STRIP.AUTO-MCNC: 249 MG/DL (ref 65–100)
GLUCOSE BLD STRIP.AUTO-MCNC: 297 MG/DL (ref 65–100)
GLUCOSE BLD STRIP.AUTO-MCNC: 330 MG/DL (ref 65–100)
GLUCOSE BLD STRIP.AUTO-MCNC: 331 MG/DL (ref 65–100)
HEALTH STATUS, XMCV2T: ABNORMAL
INR PPP: 1.1 (ref 0.9–1.1)
LDH SERPL L TO P-CCNC: 329 U/L (ref 81–246)
P-R INTERVAL, ECG05: 156 MS
PROCALCITONIN SERPL-MCNC: <0.05 NG/ML
PROTHROMBIN TIME: 11 SEC (ref 9–11.1)
Q-T INTERVAL, ECG07: 368 MS
QRS DURATION, ECG06: 58 MS
QTC CALCULATION (BEZET), ECG08: 457 MS
SAMPLES BEING HELD,HOLD: NORMAL
SERVICE CMNT-IMP: ABNORMAL
SOURCE, COVRS: ABNORMAL
SPECIMEN SOURCE, FCOV2M: ABNORMAL
SPECIMEN TYPE, XMCV1T: ABNORMAL
THERAPEUTIC RANGE,PTTT: NORMAL SECS (ref 58–77)
VENTRICULAR RATE, ECG03: 93 BPM

## 2020-11-29 PROCEDURE — 74011000258 HC RX REV CODE- 258: Performed by: GENERAL ACUTE CARE HOSPITAL

## 2020-11-29 PROCEDURE — 82728 ASSAY OF FERRITIN: CPT

## 2020-11-29 PROCEDURE — 74011000258 HC RX REV CODE- 258: Performed by: EMERGENCY MEDICINE

## 2020-11-29 PROCEDURE — 74011250637 HC RX REV CODE- 250/637: Performed by: INTERNAL MEDICINE

## 2020-11-29 PROCEDURE — 85730 THROMBOPLASTIN TIME PARTIAL: CPT

## 2020-11-29 PROCEDURE — 74011000636 HC RX REV CODE- 636: Performed by: EMERGENCY MEDICINE

## 2020-11-29 PROCEDURE — 74011250637 HC RX REV CODE- 250/637: Performed by: GENERAL ACUTE CARE HOSPITAL

## 2020-11-29 PROCEDURE — 77010033678 HC OXYGEN DAILY

## 2020-11-29 PROCEDURE — 85384 FIBRINOGEN ACTIVITY: CPT

## 2020-11-29 PROCEDURE — 83615 LACTATE (LD) (LDH) ENZYME: CPT

## 2020-11-29 PROCEDURE — 65660000000 HC RM CCU STEPDOWN

## 2020-11-29 PROCEDURE — 87635 SARS-COV-2 COVID-19 AMP PRB: CPT

## 2020-11-29 PROCEDURE — 74011250636 HC RX REV CODE- 250/636: Performed by: EMERGENCY MEDICINE

## 2020-11-29 PROCEDURE — 74011250636 HC RX REV CODE- 250/636: Performed by: INTERNAL MEDICINE

## 2020-11-29 PROCEDURE — 86140 C-REACTIVE PROTEIN: CPT

## 2020-11-29 PROCEDURE — 94640 AIRWAY INHALATION TREATMENT: CPT

## 2020-11-29 PROCEDURE — 99223 1ST HOSP IP/OBS HIGH 75: CPT | Performed by: INTERNAL MEDICINE

## 2020-11-29 PROCEDURE — 82962 GLUCOSE BLOOD TEST: CPT

## 2020-11-29 PROCEDURE — 74011000250 HC RX REV CODE- 250: Performed by: GENERAL ACUTE CARE HOSPITAL

## 2020-11-29 PROCEDURE — 84145 PROCALCITONIN (PCT): CPT

## 2020-11-29 PROCEDURE — 71275 CT ANGIOGRAPHY CHEST: CPT

## 2020-11-29 PROCEDURE — 36415 COLL VENOUS BLD VENIPUNCTURE: CPT

## 2020-11-29 PROCEDURE — 74011636637 HC RX REV CODE- 636/637: Performed by: INTERNAL MEDICINE

## 2020-11-29 PROCEDURE — 94760 N-INVAS EAR/PLS OXIMETRY 1: CPT

## 2020-11-29 PROCEDURE — 74011250636 HC RX REV CODE- 250/636: Performed by: GENERAL ACUTE CARE HOSPITAL

## 2020-11-29 PROCEDURE — XW033E5 INTRODUCTION OF REMDESIVIR ANTI-INFECTIVE INTO PERIPHERAL VEIN, PERCUTANEOUS APPROACH, NEW TECHNOLOGY GROUP 5: ICD-10-PCS | Performed by: GENERAL ACUTE CARE HOSPITAL

## 2020-11-29 PROCEDURE — 85610 PROTHROMBIN TIME: CPT

## 2020-11-29 PROCEDURE — 87040 BLOOD CULTURE FOR BACTERIA: CPT

## 2020-11-29 RX ORDER — ACETAMINOPHEN 325 MG/1
650 TABLET ORAL
Status: DISCONTINUED | OUTPATIENT
Start: 2020-11-29 | End: 2020-12-02 | Stop reason: HOSPADM

## 2020-11-29 RX ORDER — ACETAMINOPHEN 325 MG/1
650 TABLET ORAL
Status: DISCONTINUED | OUTPATIENT
Start: 2020-11-29 | End: 2020-11-29 | Stop reason: SDUPTHER

## 2020-11-29 RX ORDER — ASCORBIC ACID 500 MG
1000 TABLET ORAL 2 TIMES DAILY
Status: DISCONTINUED | OUTPATIENT
Start: 2020-11-29 | End: 2020-12-02 | Stop reason: HOSPADM

## 2020-11-29 RX ORDER — SODIUM CHLORIDE 0.9 % (FLUSH) 0.9 %
5-40 SYRINGE (ML) INJECTION EVERY 8 HOURS
Status: DISCONTINUED | OUTPATIENT
Start: 2020-11-29 | End: 2020-12-02 | Stop reason: HOSPADM

## 2020-11-29 RX ORDER — FENTANYL CITRATE 50 UG/ML
25 INJECTION, SOLUTION INTRAMUSCULAR; INTRAVENOUS
Status: DISCONTINUED | OUTPATIENT
Start: 2020-11-29 | End: 2020-12-02 | Stop reason: HOSPADM

## 2020-11-29 RX ORDER — METOPROLOL TARTRATE 25 MG/1
25 TABLET, FILM COATED ORAL 2 TIMES DAILY
Status: DISCONTINUED | OUTPATIENT
Start: 2020-11-29 | End: 2020-12-02 | Stop reason: HOSPADM

## 2020-11-29 RX ORDER — OXYCODONE AND ACETAMINOPHEN 5; 325 MG/1; MG/1
1 TABLET ORAL
Status: DISCONTINUED | OUTPATIENT
Start: 2020-11-29 | End: 2020-12-02 | Stop reason: HOSPADM

## 2020-11-29 RX ORDER — BUDESONIDE AND FORMOTEROL FUMARATE DIHYDRATE 160; 4.5 UG/1; UG/1
2 AEROSOL RESPIRATORY (INHALATION)
Status: DISCONTINUED | OUTPATIENT
Start: 2020-11-29 | End: 2020-12-02 | Stop reason: HOSPADM

## 2020-11-29 RX ORDER — BENZONATATE 100 MG/1
200 CAPSULE ORAL
Status: DISCONTINUED | OUTPATIENT
Start: 2020-11-29 | End: 2020-12-02 | Stop reason: HOSPADM

## 2020-11-29 RX ORDER — POLYETHYLENE GLYCOL 3350 17 G/17G
17 POWDER, FOR SOLUTION ORAL DAILY PRN
Status: DISCONTINUED | OUTPATIENT
Start: 2020-11-29 | End: 2020-12-02 | Stop reason: HOSPADM

## 2020-11-29 RX ORDER — GUAIFENESIN 100 MG/5ML
81 LIQUID (ML) ORAL DAILY
Status: DISCONTINUED | OUTPATIENT
Start: 2020-11-29 | End: 2020-12-02 | Stop reason: HOSPADM

## 2020-11-29 RX ORDER — CODEINE PHOSPHATE AND GUAIFENESIN 10; 100 MG/5ML; MG/5ML
10 SOLUTION ORAL
Status: DISCONTINUED | OUTPATIENT
Start: 2020-11-29 | End: 2020-11-29

## 2020-11-29 RX ORDER — INSULIN LISPRO 100 [IU]/ML
INJECTION, SOLUTION INTRAVENOUS; SUBCUTANEOUS
Status: DISCONTINUED | OUTPATIENT
Start: 2020-11-29 | End: 2020-12-02 | Stop reason: HOSPADM

## 2020-11-29 RX ORDER — SODIUM CHLORIDE 0.9 % (FLUSH) 0.9 %
5-40 SYRINGE (ML) INJECTION AS NEEDED
Status: DISCONTINUED | OUTPATIENT
Start: 2020-11-29 | End: 2020-12-02 | Stop reason: HOSPADM

## 2020-11-29 RX ORDER — SODIUM CHLORIDE 0.9 % (FLUSH) 0.9 %
10 SYRINGE (ML) INJECTION
Status: COMPLETED | OUTPATIENT
Start: 2020-11-29 | End: 2020-11-29

## 2020-11-29 RX ORDER — ZINC SULFATE 50(220)MG
220 CAPSULE ORAL DAILY
Status: DISCONTINUED | OUTPATIENT
Start: 2020-11-29 | End: 2020-12-02 | Stop reason: HOSPADM

## 2020-11-29 RX ORDER — ACETAMINOPHEN 650 MG/1
650 SUPPOSITORY RECTAL
Status: DISCONTINUED | OUTPATIENT
Start: 2020-11-29 | End: 2020-12-02 | Stop reason: HOSPADM

## 2020-11-29 RX ORDER — ENOXAPARIN SODIUM 100 MG/ML
40 INJECTION SUBCUTANEOUS EVERY 12 HOURS
Status: DISCONTINUED | OUTPATIENT
Start: 2020-11-29 | End: 2020-12-02 | Stop reason: HOSPADM

## 2020-11-29 RX ORDER — GUAIFENESIN 600 MG/1
600 TABLET, EXTENDED RELEASE ORAL EVERY 12 HOURS
Status: DISCONTINUED | OUTPATIENT
Start: 2020-11-29 | End: 2020-12-02 | Stop reason: HOSPADM

## 2020-11-29 RX ORDER — PROMETHAZINE HYDROCHLORIDE 25 MG/1
12.5 TABLET ORAL
Status: DISCONTINUED | OUTPATIENT
Start: 2020-11-29 | End: 2020-12-02 | Stop reason: HOSPADM

## 2020-11-29 RX ORDER — ATORVASTATIN CALCIUM 40 MG/1
40 TABLET, FILM COATED ORAL DAILY
Status: DISCONTINUED | OUTPATIENT
Start: 2020-11-29 | End: 2020-12-02 | Stop reason: HOSPADM

## 2020-11-29 RX ORDER — DEXTROSE 50 % IN WATER (D50W) INTRAVENOUS SYRINGE
12.5-25 AS NEEDED
Status: DISCONTINUED | OUTPATIENT
Start: 2020-11-29 | End: 2020-12-02 | Stop reason: HOSPADM

## 2020-11-29 RX ORDER — ONDANSETRON 2 MG/ML
4 INJECTION INTRAMUSCULAR; INTRAVENOUS
Status: DISCONTINUED | OUTPATIENT
Start: 2020-11-29 | End: 2020-12-02 | Stop reason: HOSPADM

## 2020-11-29 RX ORDER — MAGNESIUM SULFATE 100 %
4 CRYSTALS MISCELLANEOUS AS NEEDED
Status: DISCONTINUED | OUTPATIENT
Start: 2020-11-29 | End: 2020-12-02 | Stop reason: HOSPADM

## 2020-11-29 RX ORDER — PANTOPRAZOLE SODIUM 40 MG/1
40 TABLET, DELAYED RELEASE ORAL
Status: DISCONTINUED | OUTPATIENT
Start: 2020-11-29 | End: 2020-12-02 | Stop reason: HOSPADM

## 2020-11-29 RX ORDER — DEXAMETHASONE SODIUM PHOSPHATE 4 MG/ML
10 INJECTION, SOLUTION INTRA-ARTICULAR; INTRALESIONAL; INTRAMUSCULAR; INTRAVENOUS; SOFT TISSUE
Status: COMPLETED | OUTPATIENT
Start: 2020-11-29 | End: 2020-11-29

## 2020-11-29 RX ORDER — DEXAMETHASONE SODIUM PHOSPHATE 4 MG/ML
6 INJECTION, SOLUTION INTRA-ARTICULAR; INTRALESIONAL; INTRAMUSCULAR; INTRAVENOUS; SOFT TISSUE EVERY 24 HOURS
Status: DISCONTINUED | OUTPATIENT
Start: 2020-11-29 | End: 2020-12-02 | Stop reason: HOSPADM

## 2020-11-29 RX ADMIN — Medication 10 ML: at 00:53

## 2020-11-29 RX ADMIN — METOPROLOL TARTRATE 25 MG: 25 TABLET, FILM COATED ORAL at 17:55

## 2020-11-29 RX ADMIN — HUMAN INSULIN 8 UNITS: 100 INJECTION, SUSPENSION SUBCUTANEOUS at 17:55

## 2020-11-29 RX ADMIN — AZITHROMYCIN 500 MG: 500 INJECTION, POWDER, LYOPHILIZED, FOR SOLUTION INTRAVENOUS at 01:55

## 2020-11-29 RX ADMIN — Medication 10 ML: at 06:07

## 2020-11-29 RX ADMIN — GUAIFENESIN 600 MG: 600 TABLET, EXTENDED RELEASE ORAL at 19:58

## 2020-11-29 RX ADMIN — CEFTRIAXONE 1 G: 1 INJECTION, POWDER, FOR SOLUTION INTRAMUSCULAR; INTRAVENOUS at 01:11

## 2020-11-29 RX ADMIN — ENOXAPARIN SODIUM 40 MG: 40 INJECTION SUBCUTANEOUS at 09:37

## 2020-11-29 RX ADMIN — IOPAMIDOL 100 ML: 755 INJECTION, SOLUTION INTRAVENOUS at 00:53

## 2020-11-29 RX ADMIN — BENZONATATE 200 MG: 100 CAPSULE ORAL at 10:16

## 2020-11-29 RX ADMIN — REMDESIVIR 200 MG: 100 INJECTION, POWDER, LYOPHILIZED, FOR SOLUTION INTRAVENOUS at 16:33

## 2020-11-29 RX ADMIN — INSULIN LISPRO 3 UNITS: 100 INJECTION, SOLUTION INTRAVENOUS; SUBCUTANEOUS at 10:16

## 2020-11-29 RX ADMIN — INSULIN LISPRO 7 UNITS: 100 INJECTION, SOLUTION INTRAVENOUS; SUBCUTANEOUS at 16:30

## 2020-11-29 RX ADMIN — AZITHROMYCIN MONOHYDRATE 500 MG: 500 INJECTION, POWDER, LYOPHILIZED, FOR SOLUTION INTRAVENOUS at 23:33

## 2020-11-29 RX ADMIN — BUDESONIDE AND FORMOTEROL FUMARATE DIHYDRATE 2 PUFF: 160; 4.5 AEROSOL RESPIRATORY (INHALATION) at 08:58

## 2020-11-29 RX ADMIN — TIOTROPIUM BROMIDE INHALATION SPRAY 2 PUFF: 3.12 SPRAY, METERED RESPIRATORY (INHALATION) at 08:58

## 2020-11-29 RX ADMIN — PANTOPRAZOLE SODIUM 40 MG: 40 TABLET, DELAYED RELEASE ORAL at 09:37

## 2020-11-29 RX ADMIN — ASPIRIN 81 MG CHEWABLE TABLET 81 MG: 81 TABLET CHEWABLE at 09:35

## 2020-11-29 RX ADMIN — GUAIFENESIN 600 MG: 600 TABLET, EXTENDED RELEASE ORAL at 03:14

## 2020-11-29 RX ADMIN — DEXAMETHASONE SODIUM PHOSPHATE 10 MG: 4 INJECTION, SOLUTION INTRAMUSCULAR; INTRAVENOUS at 01:12

## 2020-11-29 RX ADMIN — OXYCODONE HYDROCHLORIDE AND ACETAMINOPHEN 1000 MG: 500 TABLET ORAL at 17:55

## 2020-11-29 RX ADMIN — Medication 10 ML: at 21:46

## 2020-11-29 RX ADMIN — BUDESONIDE AND FORMOTEROL FUMARATE DIHYDRATE 2 PUFF: 160; 4.5 AEROSOL RESPIRATORY (INHALATION) at 21:00

## 2020-11-29 RX ADMIN — INSULIN LISPRO 3 UNITS: 100 INJECTION, SOLUTION INTRAVENOUS; SUBCUTANEOUS at 21:38

## 2020-11-29 RX ADMIN — INSULIN LISPRO 7 UNITS: 100 INJECTION, SOLUTION INTRAVENOUS; SUBCUTANEOUS at 13:16

## 2020-11-29 RX ADMIN — OXYCODONE HYDROCHLORIDE AND ACETAMINOPHEN 1000 MG: 500 TABLET ORAL at 09:36

## 2020-11-29 RX ADMIN — ATORVASTATIN CALCIUM 40 MG: 40 TABLET, FILM COATED ORAL at 09:36

## 2020-11-29 RX ADMIN — DEXAMETHASONE SODIUM PHOSPHATE 6 MG: 4 INJECTION, SOLUTION INTRA-ARTICULAR; INTRALESIONAL; INTRAMUSCULAR; INTRAVENOUS; SOFT TISSUE at 09:37

## 2020-11-29 RX ADMIN — Medication 10 ML: at 15:36

## 2020-11-29 RX ADMIN — HUMAN INSULIN 8 UNITS: 100 INJECTION, SUSPENSION SUBCUTANEOUS at 10:16

## 2020-11-29 RX ADMIN — ZINC SULFATE 220 MG (50 MG) CAPSULE 220 MG: CAPSULE at 09:36

## 2020-11-29 RX ADMIN — METOPROLOL TARTRATE 25 MG: 25 TABLET, FILM COATED ORAL at 09:36

## 2020-11-29 RX ADMIN — ACETAMINOPHEN 650 MG: 325 TABLET ORAL at 19:58

## 2020-11-29 RX ADMIN — BENZONATATE 200 MG: 100 CAPSULE ORAL at 21:38

## 2020-11-29 RX ADMIN — GUAIFENESIN 600 MG: 600 TABLET, EXTENDED RELEASE ORAL at 09:37

## 2020-11-29 RX ADMIN — ENOXAPARIN SODIUM 40 MG: 40 INJECTION SUBCUTANEOUS at 19:58

## 2020-11-29 NOTE — H&P
Hospitalist Admission Note    NAME: Elias Cruz   :  [id number]   MRN:  [id number]     Date/Time:  2020 2:20 AM    Patient PCP: Gail Gunderson MD  ______________________________________________________________________  Given the patient's current clinical presentation, I have a high level of concern for decompensation if discharged from the emergency department. Complex decision making was performed, which includes reviewing the patient's available past medical records, laboratory results, and x-ray films. My assessment of this patient's clinical condition and my plan of care is as follows. Assessment / Plan:  Acute on Chronic Respiratory Failure with Hypoxia POA (O2 sat 88% with RR 27, on PRN O2 at home)  Due to Pneumonia due to COVID-19 virus   Baseline COPD without Exacerbation  Admit to tele  Px IV Rocephin and Zithromax  Serial Inflammatory markers, D-Dimer and Procalcitonin  Pulmonary and ID consult  Decadron 6mg IV Daily, if starts wheezing or getting worse then consider increasing steroids frequency  O2 support  Vitamin C and Zinc  Mucolytic and antitussives  Scheduled Combivent  Pharmacy substitute for formulary inhalers  CTA chest in ED with Minimal airspace disease at the in the lower lobes and minimal groundglass opacification in the right upper lobe    Type II diabetes mellitus   Hold Metformin  Suspect blood sugar will run high due to steroids, will place on NPH 8 units BID  SSI for now    HTN (hypertension)   Continue metoprolol    GERD (gastroesophageal reflux disease)   Continue PPIs    Hyperlipidemia  Continue Statins    Code Status: Full  Surrogate Decision Maker:     DVT Prophylaxis: Lovenox    Baseline: functional      Subjective:   CHIEF COMPLAINT: cough and shortness of breath    HISTORY OF PRESENT ILLNESS:     Uziel Post is a 76 y.o.  female who presents with cough and shortness of breath.  Pt reported she started to have cough and progressively worsening shortness of breath for past 6 days. She denies any fever, chills, nausea, vomiting, diarrhea, chest pain, problems urination. In ED pt noted to be hypoxic 88% per ED and Bedside RN verbal report. CTA chest showed Minimal airspace disease at the in the lower lobes and minimal groundglass opacification in the right upper lobe. She also found COVID19 positive in ED. We were asked to admit for work up and evaluation of the above problems. Past Medical History:   Diagnosis Date    CAD (coronary artery disease)     Cancer ([address]     breast cancer with right radical mastectomy     Chronic obstructive pulmonary disease (HCC)     Diabetes ([address]     Gastrointestinal disorder     heart burn    Sleep disorder     uses CPAP at night        Past Surgical History:   Procedure Laterality Date    BREAST SURGERY PROCEDURE UNLISTED      right radical mastectomy    CARDIAC SURG PROCEDURE UNLIST      1 cardiac stent        Social History     Tobacco Use    Smoking status: Former Smoker     Last attempt to quit: 2011     Years since quittin.9   Substance Use Topics    Alcohol use: Yes     Comment: 2 beers per night      Family history: positive for DM in the family    Allergies   Allergen Reactions    Codeine Nausea and Vomiting and Vertigo        Prior to Admission medications    Medication Sig Start Date End Date Taking? Authorizing Provider   HYDROcodone-acetaminophen (NORCO) 7.5-325 mg per tablet Take 1 Tab by mouth every six (6) hours as needed for Pain. Max Daily Amount: 4 Tabs. 17   Rashmi Avitia MD   metFORMIN (GLUCOPHAGE) 1,000 mg tablet Take 1,000 mg by mouth two (2) times a day. Paola Rodriguse MD   omeprazole (PRILOSEC) 20 mg capsule Take 20 mg by mouth daily. Paola Rodrigues MD   metoprolol tartrate (LOPRESSOR) 25 mg tablet Take 25 mg by mouth two (2) times a day. Paola Rodrigues MD   aspirin 81 mg chewable tablet Take 81 mg by mouth daily.     Paola Rodrigues MD predniSONE (DELTASONE) 10 mg tablet Take 10 mg by mouth daily. Paola Rodrigues MD   atorvastatin (LIPITOR) 40 mg tablet Take 40 mg by mouth daily. Paola Rodrigues MD   albuterol (PROAIR HFA) 90 mcg/actuation inhaler Take 1 Puff by inhalation every six (6) hours as needed for Wheezing. Paola Rodrigues MD   tiotropium bromide (SPIRIVA RESPIMAT) 2.5 mcg/actuation inhaler Take 2 Puffs by inhalation daily. Paola Rodrigues MD   albuterol-ipratropium (DUONEB) 2.5 mg-0.5 mg/3 ml nebu 3 mL by Nebulization route every six (6) hours as needed. Paola Rodrigues MD   budesonide-formoterol (SYMBICORT) 160-4.5 mcg/actuation HFA inhaler Take 2 Puffs by inhalation two (2) times a day. Paola Rodrigues MD       REVIEW OF SYSTEMS:     I am not able to complete the review of systems because:    The patient is intubated and sedated    The patient has altered mental status due to his acute medical problems    The patient has baseline aphasia from prior stroke(s)    The patient has baseline dementia and is not reliable historian    The patient is in acute medical distress and unable to provide information           Total of 12 systems reviewed as follows:       POSITIVE= underlined text  Negative = text not underlined  General:  fever, chills, sweats, generalized weakness, weight loss/gain,      loss of appetite   Eyes:    blurred vision, eye pain, loss of vision, double vision  ENT:    rhinorrhea, pharyngitis   Respiratory:   cough, sputum production, SOB, BENTLEY, wheezing, pleuritic pain   Cardiology:   chest pain, palpitations, orthopnea, PND, edema, syncope   Gastrointestinal:  abdominal pain , N/V, diarrhea, dysphagia, constipation, bleeding   Genitourinary:  frequency, urgency, dysuria, hematuria, incontinence   Muskuloskeletal :  arthralgia, myalgia, back pain  Hematology:  easy bruising, nose or gum bleeding, lymphadenopathy   Dermatological: rash, ulceration, pruritis, color change / jaundice  Endocrine:   hot flashes or polydipsia Neurological:  headache, dizziness, confusion, focal weakness, paresthesia,     Speech difficulties, memory loss, gait difficulty  Psychological: Feelings of anxiety, depression, agitation    Objective:   VITALS:    Visit Vitals  BP (!) 149/70   Pulse 88   Temp 98.3 °F (36.8 °C)   Resp 27   Ht 5' 3\" (1.6 m)   Wt 104.3 kg (230 lb)   SpO2 (!) 89%   BMI 40.74 kg/m²       PHYSICAL EXAM:    General:    Alert, cooperative, no distress, appears stated age. HEENT: Atraumatic, anicteric sclerae, pink conjunctivae     No oral ulcers, mucosa moist, throat clear, dentition fair  Neck:  Supple, symmetrical,  thyroid: non tender  Lungs:   Clear to auscultation bilaterally. No Wheezing or Rhonchi. No rales. Chest wall:  No tenderness  No Accessory muscle use. Heart:   Regular  rhythm,  No  murmur   No edema  Abdomen:   Soft, non-tender. Not distended. Bowel sounds normal  Extremities: No cyanosis. No clubbing,      Skin turgor normal, Capillary refill normal, Radial dial pulse 2+  Skin:     Not pale. Not Jaundiced  No rashes   Psych:  Good insight. Not depressed. Not anxious or agitated. Neurologic: EOMs intact. No facial asymmetry. No aphasia or slurred speech. Symmetrical strength, Sensation grossly intact.  Alert and oriented X 4.     _______________________________________________________________________  Care Plan discussed with:    Comments   Patient y    Family      RN y    Care Manager                    Consultant:  michelle ED physician   _______________________________________________________________________  Expected  Disposition:   Home with Family y   HH/PT/OT/RN    SNF/LTC    [place]    ________________________________________________________________________  TOTAL TIME: 61 Minutes    Critical Care Provided     Minutes non procedure based      Comments    y Reviewed previous records   >50% of visit spent in counseling and coordination of care y Discussion with patient and questions answered ________________________________________________________________________  Signed: Caio Polo MD    Procedures: see electronic medical records for all procedures/Xrays and details which were not copied into this note but were reviewed prior to creation of Plan. LAB DATA REVIEWED:    Recent Results (from the past 24 hour(s))   EKG, 12 LEAD, INITIAL    Collection Time: 11/28/20  5:11 PM   Result Value Ref Range    Ventricular Rate 93 BPM    Atrial Rate 93 BPM    P-R Interval 156 ms    QRS Duration 58 ms    Q-T Interval 368 ms    QTC Calculation (Bezet) 457 ms    Calculated P Axis 52 degrees    Calculated R Axis -22 degrees    Calculated T Axis 45 degrees    Diagnosis       Sinus rhythm with premature atrial complexes  Low voltage QRS  Cannot rule out Anterior infarct (cited on or before 28-NOV-2020)  When compared with ECG of 23-MAY-2017 20:38,  premature atrial complexes are now present  Nonspecific T wave abnormality has replaced inverted T waves in Lateral leads     CBC WITH AUTOMATED DIFF    Collection Time: 11/28/20  5:46 PM   Result Value Ref Range    WBC 7.7 3.6 - 11.0 K/uL    RBC 5.63 (H) 3.80 - 5.20 M/uL    HGB 14.9 11.5 - 16.0 g/dL    HCT 47.3 (H) 35.0 - 47.0 %    MCV 84.0 80.0 - 99.0 FL    MCH 26.5 26.0 - 34.0 PG    MCHC 31.5 30.0 - 36.5 g/dL    RDW 14.8 (H) 11.5 - 14.5 %    PLATELET 774 623 - 055 K/uL    MPV 11.8 8.9 - 12.9 FL    NRBC 0.0 0  WBC    ABSOLUTE NRBC 0.00 0.00 - 0.01 K/uL    NEUTROPHILS 74 32 - 75 %    LYMPHOCYTES 17 12 - 49 %    MONOCYTES 9 5 - 13 %    EOSINOPHILS 0 0 - 7 %    BASOPHILS 0 0 - 1 %    IMMATURE GRANULOCYTES 0 0.0 - 0.5 %    ABS. NEUTROPHILS 5.6 1.8 - 8.0 K/UL    ABS. LYMPHOCYTES 1.3 0.8 - 3.5 K/UL    ABS. MONOCYTES 0.7 0.0 - 1.0 K/UL    ABS. EOSINOPHILS 0.0 0.0 - 0.4 K/UL    ABS. BASOPHILS 0.0 0.0 - 0.1 K/UL    ABS. IMM.  GRANS. 0.0 0.00 - 0.04 K/UL    DF AUTOMATED     METABOLIC PANEL, COMPREHENSIVE    Collection Time: 11/28/20  5:46 PM   Result Value Ref Range Sodium 135 (L) 136 - 145 mmol/L    Potassium 3.5 3.5 - 5.1 mmol/L    Chloride 103 97 - 108 mmol/L    CO2 23 21 - 32 mmol/L    Anion gap 9 5 - 15 mmol/L    Glucose 208 (H) 65 - 100 mg/dL    BUN 13 6 - 20 MG/DL    Creatinine 1.16 (H) 0.55 - 1.02 MG/DL    BUN/Creatinine ratio 11 (L) 12 - 20      GFR est AA 57 (L) >60 ml/min/1.73m2    GFR est non-AA 47 (L) >60 ml/min/1.73m2    Calcium 8.9 8.5 - 10.1 MG/DL    Bilirubin, total 0.5 0.2 - 1.0 MG/DL    ALT (SGPT) 55 12 - 78 U/L    AST (SGOT) 49 (H) 15 - 37 U/L    Alk. phosphatase 102 45 - 117 U/L    Protein, total 7.0 6.4 - 8.2 g/dL    Albumin 2.9 (L) 3.5 - 5.0 g/dL    Globulin 4.1 (H) 2.0 - 4.0 g/dL    A-G Ratio 0.7 (L) 1.1 - 2.2     TROPONIN I    Collection Time: 11/28/20  5:46 PM   Result Value Ref Range    Troponin-I, Qt. <0.05 <0.05 ng/mL   SAMPLES BEING HELD    Collection Time: 11/29/20 12:30 AM   Result Value Ref Range    SAMPLES BEING HELD 1LAV     COMMENT        Add-on orders for these samples will be processed based on acceptable specimen integrity and analyte stability, which may vary by analyte.    PROCALCITONIN    Collection Time: 11/29/20 12:33 AM   Result Value Ref Range    Procalcitonin <0.05 ng/mL   PTT    Collection Time: 11/29/20 12:33 AM   Result Value Ref Range    aPTT 25.2 22.1 - 31.0 sec    aPTT, therapeutic range     58.0 - 77.0 SECS   PROTHROMBIN TIME + INR    Collection Time: 11/29/20 12:33 AM   Result Value Ref Range    INR 1.1 0.9 - 1.1      Prothrombin time 11.0 9.0 - 11.1 sec   LD    Collection Time: 11/29/20 12:33 AM   Result Value Ref Range     (H) 81 - 246 U/L   FIBRINOGEN    Collection Time: 11/29/20 12:33 AM   Result Value Ref Range    Fibrinogen 572 (H) 200 - 475 mg/dL   SARS-COV-2    Collection Time: 11/29/20  1:25 AM   Result Value Ref Range    Specimen source Nasopharyngeal      Specimen source Nasopharyngeal      COVID-19 rapid test Detected (AA) NOTD      Specimen type NP Swab      Health status Symptomatic Testing

## 2020-11-29 NOTE — ED NOTES
TRANSFER - OUT REPORT:    Verbal report given to [employer] (name) on [facility]  being transferred to ortho (unit) for routine progression of care       Report consisted of patients Situation, Background, Assessment and   Recommendations(SBAR). Information from the following report(s) SBAR, ED Summary, [facility] and Recent Results was reviewed with the receiving nurse. Lines:   Peripheral IV 11/28/20 Left Forearm (Active)   Site Assessment Clean, dry, & intact 11/28/20 1751   Phlebitis Assessment 0 11/28/20 1751   Infiltration Assessment 0 11/28/20 1751   Dressing Status Clean, dry, & intact 11/28/20 1751   Dressing Type Tape;Transparent 11/28/20 1751   Hub Color/Line Status Flushed;Patent 11/28/20 1751   Action Taken Blood drawn 11/28/20 1751        Opportunity for questions and clarification was provided.

## 2020-11-29 NOTE — ED NOTES
Report received from [facility]. They advised of the patient's chief complaint, current status, orders completed (to include IV access/medications/radiology testing), outstanding orders that still need to be completed, and the treatment plan. Questions asked and answered prior to assumption of care.

## 2020-11-29 NOTE — PROGRESS NOTES
Problem: Airway Clearance - Ineffective  Goal: Achieve or maintain patent airway  Outcome: Progressing Towards Goal     Problem: Gas Exchange - Impaired  Goal: Absence of hypoxia  Outcome: Progressing Towards Goal  Goal: Promote optimal lung function  Outcome: Progressing Towards Goal     Problem: Breathing Pattern - Ineffective  Goal: Ability to achieve and maintain a regular respiratory rate  Outcome: Progressing Towards Goal     Problem:  Body Temperature -  Risk of, Imbalanced  Goal: Ability to maintain a body temperature within defined limits  Outcome: Progressing Towards Goal  Goal: Will regain or maintain usual level of consciousness  Outcome: Progressing Towards Goal  Goal: Complications related to the disease process, condition or treatment will be avoided or minimized  Outcome: Progressing Towards Goal

## 2020-11-29 NOTE — CONSULTS
[id number]    Name:  Wendy Rosa  MR#:  [id number]  :  1955  ACCOUNT #:  [de-identified]  DATE OF SERVICE:  2020      PULMONARY CONSULTATION    REASON FOR CONSULTATION:  Kindly asked by Dr. Yue Galarza to see the patient in consultation on this beautiful  afternoon for acute on chronic hypoxic respiratory failure and COVID pneumonia. HISTORY OF PRESENT ILLNESS:  The patient is a 79-year-old woman previously followed by [facility] for chronic obstructive pulmonary disease with spirometry in 2016 revealing an FEV1 of 1.18 liters or 49%. In addition, the patient's previous chest CT scan at the time of her mastectomy in  did not reveal the infiltrates that it does on a most recent scan. The patient presented to the hospital with increasing shortness of breath and cough productive at times of mucus which she swallows and does not inspect. She does not note hemoptysis or persistent fevers, but her respiratory symptoms have worsened over the last 6 days. She works at [employer] in [place] and feels this where she contracted her disease. The patient chronically takes prednisone 5 mg a day, though recently increased this prior to admission to 10 mg a day. She is also on Symbicort and Spiriva, I believe. Furthermore she is compliant with her CPAP for sleep apnea at night. PAST MEDICAL HISTORY:  Chronic obstructive pulmonary disease,atherosclerotic heart disease, breast cancer with right mastectomy , diabetes, GERD, sleep apnea, and she is compliant with her CPAP. PAST SURGICAL HISTORY:  Notable for coronary stent in , and right mastectomy in . SOCIAL HISTORY:  She quit smoking in , I believe. She is employed outside the home. FAMILY HISTORY:  Cardiovascular disease in her brother and breast cancer in her sister.     REVIEW OF SYSTEMS:  No headache, seizures, hemoptysis, hematemesis, melena, hematochezia, rash, claudication, fever, or unintentional weight loss, or joint abnormalities or orthopnea. The remainder of the review of systems as per the HPI is otherwise negative. PHYSICAL EXAMINATION:  GENERAL:  Cushingoid appearing woman, in no acute distress. Breathless with prolonged speaking. VITAL SIGNS:  Blood pressure 140/60, heart rate 80, temperature 98.3, respiratory rate 19, saturations are 93% on 2 L of O2. Facies is cushingoid. HEENT:  Sclerae anicteric. Oropharynx unremarkable. CHEST:  Bilaterally symmetric. LUNGS:  Clear. No rales or wheezing. HEART:  Regular rate and rhythm, no murmur. ABDOMEN:  Obese. EXTREMITIES:  With trace edema. There are palpable peripheral pulses. SKIN:  Without acute eruption. MUSCULOSKELETAL:  No joint abnormalities. RADIOLOGY DATA:  Chest CT scan is notable for scattered infiltrates. LABORATORY DATA:  Her INR is 1.1, sodium 135, creatinine 1.16, ALT 55, AST 49. Once again, the CT from 11/29/2020, lower lobe and right upper lobe ground-glass infiltrates. IMPRESSION:  The patient is a 79-year-old former smoker with chronic obstructive pulmonary disease, and FEV1 from 08/15/2016 of 1.8 liters or 49%, who is maintained chronically on 5 mg of prednisone though increased the dose a few days prior to admission as her chronic dyspnea worsened and her cough worsened. Fortunately, her oxygen requirements are minimal.  Her lung exam is notable for clear lungs without rales or wheezes and her chest CT scan reveals fairly mild pulmonary infiltrates. In addition, she is without gastrointestinal symptoms and her admission labs revealed only a white count of 7.7. I am very concerned in regards to her history of significant air flow obstruction on her pulmonary function tests due to chronic obstructive pulmonary disease.   Her history of malignancy, her history of atherosclerotic heart disease and her stent and most worrisome is the fact that she is on chronic prednisone 5 mg a day for palliation of her respiratory symptoms. PLAN:  1. Oxygen. 2.  Antibiotics. 3.  Nebs. 4.  DVT prophylaxis. 5.  Remdesivir has been ordered, but I am unaware of a clinical benefit as discussed with the patient.         Magalys Membreno MD      [id number]  D:  11/29/2020 13:50  T:  11/29/2020 16:57  JOB #:  [id number]

## 2020-11-29 NOTE — CONSULTS
Infectious Disease Consult  Vasiliy Ramos MD FACP    Date of Consultation:  November 29, 2020  Date of Admission: 11/28/2020   Referring Physician: Dr Montoya Head:     Patient is a 76 y.o. female who is being seen for -Covid pneumonia , remdesivir        IMPRESSION:   · Covid pneumonia  · Covid rapid positive on 11/29  · CTA-airspace disease lower lobes, GGO right upper lobe  · Acute hypoxic respiratory failure 96% on 2 L oxygen  · Elevated acute phase reactants-, CRP 5.45, ferritin 368, procalcitonin < 0.05  · COPD, CHELSEA on CPAP  · Diabetes type 2  · Hypertension  · H/o CA breast  · Morbid obesity BMI 40  · Past history of smoking       PLAN:      · Patient meets inclusion criteria for remdesivir IV, will start now  · Daily CMP-monitor creatinine, LFTs  · Hold remdesivir if eGFR<30 or AST, ALT >5ULN  · Continue ceftriaxone, azithromycin, vitamin C, zinc, Decadron  · Monitor acute phase reactants  · Remdesivir may be DC'd after day 3 if patient clinically improves and not requiring oxygen  · If further decline consider convalescent plasma     Arvid Burundian is a 76 y.o. female who present presented to ED with cough and shortness of breath. Pt reported she had a  cough and progressively   worsening shortness of breath for past 6 days. She denied any fever, chills, nausea, vomiting, diarrhea, chest pain, problems urination. Per H&P in ED   pt was  hypoxic at 88% on room air. CTA chest showed Minimal airspace disease at the in the lower lobes and minimal groundglass opacification in the   right upper lobe. She also found COVID19 positive in ED. On speaking to patient today she states she feels a little better. Still very short of breath. Cough present, minimal mucus production. Patient denies abdominal pain or diarrhea. Discussed with patient remdesivir risk benefits clinical trial outcomes.  Patient would like to be on medication.     Patient Active Problem List   Diagnosis Code    Hypoxia R09.02  Pneumonia due to COVID-19 virus U07.1, J12.89    Type II diabetes mellitus ([facility] E11.9    HTN (hypertension) I10    GERD (gastroesophageal reflux disease) K21.9    Hyperlipidemia E78.5     Past Medical History:   Diagnosis Date    CAD (coronary artery disease)     Cancer ([facility]     breast cancer with right radical mastectomy     Chronic obstructive pulmonary disease ([facility]     Diabetes ([facility]     Gastrointestinal disorder     heart burn    Sleep disorder     uses CPAP at night      History reviewed. No pertinent family history. Social History     Tobacco Use    Smoking status: Former Smoker     Last attempt to quit: 2011     Years since quittin.9   Substance Use Topics    Alcohol use: Yes     Comment: 2 beers per night     Past Surgical History:   Procedure Laterality Date    BREAST SURGERY PROCEDURE UNLISTED      right radical mastectomy    CARDIAC SURG PROCEDURE UNLIST      1 cardiac stent       Prior to Admission medications    Medication Sig Start Date End Date Taking? Authorizing Provider   HYDROcodone-acetaminophen (NORCO) 7.5-325 mg per tablet Take 1 Tab by mouth every six (6) hours as needed for Pain. Max Daily Amount: 4 Tabs. 17   Teddy Austin MD   metFORMIN (GLUCOPHAGE) 1,000 mg tablet Take 1,000 mg by mouth two (2) times a day. Paola Rodrigues MD   omeprazole (PRILOSEC) 20 mg capsule Take 20 mg by mouth daily. Paola Rodrigues MD   metoprolol tartrate (LOPRESSOR) 25 mg tablet Take 25 mg by mouth two (2) times a day. Paola Rodrigues MD   aspirin 81 mg chewable tablet Take 81 mg by mouth daily. Paola Rodrigues MD   predniSONE (DELTASONE) 10 mg tablet Take 10 mg by mouth daily. Paola Rodrigues MD   atorvastatin (LIPITOR) 40 mg tablet Take 40 mg by mouth daily. Paola Rodrigues MD   albuterol (PROAIR HFA) 90 mcg/actuation inhaler Take 1 Puff by inhalation every six (6) hours as needed for Wheezing.     Paola Rodrigues MD   tiotropium bromide (SPIRIVA RESPIMAT) 2.5 mcg/actuation inhaler Take 2 Puffs by inhalation daily. Paola Rodrigues MD   albuterol-ipratropium (DUONEB) 2.5 mg-0.5 mg/3 ml nebu 3 mL by Nebulization route every six (6) hours as needed. Paola Rodrigues MD   budesonide-formoterol (SYMBICORT) 160-4.5 mcg/actuation HFA inhaler Take 2 Puffs by inhalation two (2) times a day. Paola Rodrigues MD     Allergies   Allergen Reactions    Codeine Nausea and Vomiting and Vertigo        Review of Systems:  A comprehensive review of systems was negative except for that written in the History of Present Illness. 10 point review of systems obtained . All other systems negative. Objective:   Blood pressure (!) 142/83, pulse 78, temperature 97.9 °F (36.6 °C), resp. rate 18, height 5' 3\" (1.6 m), weight 230 lb (104.3 kg), SpO2 96 %.   Temp (24hrs), Av.9 °F (36.6 °C), Min:97.3 °F (36.3 °C), Max:98.3 °F (36.8 °C)    Current Facility-Administered Medications   Medication Dose Route Frequency    acetaminophen (TYLENOL) tablet 650 mg  650 mg Oral Q6H PRN    [START ON 2020] azithromycin (ZITHROMAX) 500 mg in 0.9% sodium chloride 250 mL (VIAL-MATE)  500 mg IntraVENous Q24H    [START ON 2020] cefTRIAXone (ROCEPHIN) 1 g in 0.9% sodium chloride (MBP/ADV) 50 mL MBP  1 g IntraVENous Q24H    sodium chloride (NS) flush 5-40 mL  5-40 mL IntraVENous Q8H    sodium chloride (NS) flush 5-40 mL  5-40 mL IntraVENous PRN    acetaminophen (TYLENOL) tablet 650 mg  650 mg Oral Q6H PRN    Or    acetaminophen (TYLENOL) suppository 650 mg  650 mg Rectal Q6H PRN    polyethylene glycol (MIRALAX) packet 17 g  17 g Oral DAILY PRN    promethazine (PHENERGAN) tablet 12.5 mg  12.5 mg Oral Q6H PRN    Or    ondansetron (ZOFRAN) injection 4 mg  4 mg IntraVENous Q6H PRN    ascorbic acid (vitamin C) (VITAMIN C) tablet 1,000 mg  1,000 mg Oral BID    zinc sulfate (ZINCATE) 220 (50) mg capsule 220 mg  220 mg Oral DAILY    acetaminophen (TYLENOL) tablet 650 mg  650 mg Oral Q6H PRN    Or    acetaminophen (TYLENOL) suppository 650 mg  650 mg Rectal Q6H PRN    enoxaparin (LOVENOX) injection 40 mg  40 mg SubCUTAneous Q12H    insulin lispro (HUMALOG) injection   SubCUTAneous AC&HS    glucose chewable tablet 16 g  4 Tab Oral PRN    dextrose (D50W) injection syrg 12.5-25 g  12.5-25 g IntraVENous PRN    glucagon (GLUCAGEN) injection 1 mg  1 mg IntraMUSCular PRN    aspirin chewable tablet 81 mg  81 mg Oral DAILY    atorvastatin (LIPITOR) tablet 40 mg  40 mg Oral DAILY    metoprolol tartrate (LOPRESSOR) tablet 25 mg  25 mg Oral BID    pantoprazole (PROTONIX) tablet 40 mg  40 mg Oral ACB    tiotropium bromide (SPIRIVA RESPIMAT) 2.5 mcg /actuation  2 Puff Inhalation DAILY    dexamethasone (DECADRON) 4 mg/mL injection 6 mg  6 mg IntraVENous Q24H    insulin NPH (NOVOLIN N, HUMULIN N) injection 8 Units  8 Units SubCUTAneous ACB&D    guaiFENesin ER (MUCINEX) tablet 600 mg  600 mg Oral Q12H    benzonatate (TESSALON) capsule 200 mg  200 mg Oral TID PRN    ipratropium-albuterol (COMBIVENT RESPIMAT) 20 mcg-100 mcg inhalation spray  1 Puff Inhalation Q6H PRN    budesonide-formoteroL (SYMBICORT) 160-4.5 mcg/actuation HFA inhaler 2 Puff  2 Puff Inhalation BID RT    inhalational spacing device   Does Not Apply PRN        Exam:    11/29/20 1148  97.9 °F (36.6 °C)  78  142/83Abnormal    --  At rest  18  96 %  --  --  Alert  1  --    11/29/20 0858  --  --  --  --  --  --  97 %  Nasal cannula  2 l/min  --  --  --    11/29/20 0813  97.3 °F (36.3 °C)  82  146/94Abnormal    --  At rest  20  95 %  Nasal cannula  2 l/min  Alert  1  --    11/29/20 0606  98 °F (36.7 °C)  73  151/79Abnormal    --  At rest  20  96 %  --  --  Alert  1  --    11/29/20 0430  --  69  133/54Abnormal    --  --  24  95 %  --  --  --  --  --    11/29/20 0330  --  74  135/57Abnormal    --  --  22  94 %  --  --  --  --  --    11/29/20 0230  97.8 °F (36.6 °C)  77  128/57Abnormal    --  --  16  94 %  --  --  Alert  1  --          Data Reviewed: CBC:   Recent Labs     11/28/20  1746   WBC 7.7   RBC 5.63*   HGB 14.9   HCT 47.3*      GRANS 74   LYMPH 17   EOS 0     CMP:   Recent Labs     11/28/20  1746   *   *   K 3.5      CO2 23   BUN 13   CREA 1.16*   CA 8.9   AGAP 9   BUCR 11*      TP 7.0   ALB 2.9*   GLOB 4.1*   AGRAT 0.7*       Lab Results   Component Value Date/Time    Culture result: NO GROWTH AFTER 8 HOURS 11/29/2020 12:26 AM          XR Results (most recent):  Results from Hospital Encounter encounter on 11/28/20   XR CHEST PORT    Narrative INDICATION:  SOB     EXAM: Single portable view of chest 1846 . Comparison:5/23/2017    Findings: Cardiac silhouette is enlarged. Pulmonary vasculature is not engorged. There are no focal parenchymal opacities, effusions, or pneumothorax. Impression Impression:  1. Enlarged cardiac silhouette, otherwise no acute disease               ICD-10-CM ICD-9-CM    1. Acute respiratory failure due to COVID-19 ([facility])  U07.1 518.81     J96.00 079.89    2. Hypoxia  R09.02 799.02            Antibiotic History  Ceftriaxone, azithromycin IV  I have discussed the diagnosis with the patient and the intended plan as seen in the above orders. I have discussed medication side effects and warnings with the patient as well.     Reviewed test results at length with patient    Signed By: Alphonso Gavin MD FACP    November 29, 2020

## 2020-11-30 LAB
ALBUMIN SERPL-MCNC: 2.6 G/DL (ref 3.5–5)
ALBUMIN/GLOB SERPL: 0.7 {RATIO} (ref 1.1–2.2)
ALP SERPL-CCNC: 83 U/L (ref 45–117)
ALT SERPL-CCNC: 41 U/L (ref 12–78)
ANION GAP SERPL CALC-SCNC: 6 MMOL/L (ref 5–15)
APTT PPP: 25.9 SEC (ref 22.1–31)
AST SERPL-CCNC: 26 U/L (ref 15–37)
BASOPHILS # BLD: 0 K/UL (ref 0–0.1)
BASOPHILS NFR BLD: 0 % (ref 0–1)
BILIRUB SERPL-MCNC: 0.4 MG/DL (ref 0.2–1)
BUN SERPL-MCNC: 16 MG/DL (ref 6–20)
BUN/CREAT SERPL: 18 (ref 12–20)
CALCIUM SERPL-MCNC: 8.8 MG/DL (ref 8.5–10.1)
CHLORIDE SERPL-SCNC: 107 MMOL/L (ref 97–108)
CO2 SERPL-SCNC: 25 MMOL/L (ref 21–32)
CREAT SERPL-MCNC: 0.89 MG/DL (ref 0.55–1.02)
CRP SERPL-MCNC: 3.17 MG/DL (ref 0–0.6)
DIFFERENTIAL METHOD BLD: ABNORMAL
EOSINOPHIL # BLD: 0 K/UL (ref 0–0.4)
EOSINOPHIL NFR BLD: 0 % (ref 0–7)
ERYTHROCYTE [DISTWIDTH] IN BLOOD BY AUTOMATED COUNT: 14.5 % (ref 11.5–14.5)
FERRITIN SERPL-MCNC: 358 NG/ML (ref 26–388)
FIBRINOGEN PPP-MCNC: 476 MG/DL (ref 200–475)
GLOBULIN SER CALC-MCNC: 3.7 G/DL (ref 2–4)
GLUCOSE BLD STRIP.AUTO-MCNC: 208 MG/DL (ref 65–100)
GLUCOSE BLD STRIP.AUTO-MCNC: 258 MG/DL (ref 65–100)
GLUCOSE BLD STRIP.AUTO-MCNC: 290 MG/DL (ref 65–100)
GLUCOSE BLD STRIP.AUTO-MCNC: 360 MG/DL (ref 65–100)
GLUCOSE SERPL-MCNC: 254 MG/DL (ref 65–100)
HCT VFR BLD AUTO: 42.6 % (ref 35–47)
HGB BLD-MCNC: 13.5 G/DL (ref 11.5–16)
IMM GRANULOCYTES # BLD AUTO: 0.1 K/UL (ref 0–0.04)
IMM GRANULOCYTES NFR BLD AUTO: 1 % (ref 0–0.5)
LYMPHOCYTES # BLD: 1.2 K/UL (ref 0.8–3.5)
LYMPHOCYTES NFR BLD: 15 % (ref 12–49)
MAGNESIUM SERPL-MCNC: 2 MG/DL (ref 1.6–2.4)
MCH RBC QN AUTO: 26.3 PG (ref 26–34)
MCHC RBC AUTO-ENTMCNC: 31.7 G/DL (ref 30–36.5)
MCV RBC AUTO: 82.9 FL (ref 80–99)
MONOCYTES # BLD: 0.6 K/UL (ref 0–1)
MONOCYTES NFR BLD: 8 % (ref 5–13)
NEUTS SEG # BLD: 6.3 K/UL (ref 1.8–8)
NEUTS SEG NFR BLD: 76 % (ref 32–75)
NRBC # BLD: 0 K/UL (ref 0–0.01)
NRBC BLD-RTO: 0 PER 100 WBC
PLATELET # BLD AUTO: 189 K/UL (ref 150–400)
PMV BLD AUTO: 11.6 FL (ref 8.9–12.9)
POTASSIUM SERPL-SCNC: 3.9 MMOL/L (ref 3.5–5.1)
PROT SERPL-MCNC: 6.3 G/DL (ref 6.4–8.2)
RBC # BLD AUTO: 5.14 M/UL (ref 3.8–5.2)
SERVICE CMNT-IMP: ABNORMAL
SODIUM SERPL-SCNC: 138 MMOL/L (ref 136–145)
THERAPEUTIC RANGE,PTTT: NORMAL SECS (ref 58–77)
WBC # BLD AUTO: 8.2 K/UL (ref 3.6–11)

## 2020-11-30 PROCEDURE — 74011000258 HC RX REV CODE- 258: Performed by: GENERAL ACUTE CARE HOSPITAL

## 2020-11-30 PROCEDURE — 36415 COLL VENOUS BLD VENIPUNCTURE: CPT

## 2020-11-30 PROCEDURE — 74011636637 HC RX REV CODE- 636/637: Performed by: INTERNAL MEDICINE

## 2020-11-30 PROCEDURE — 74011250636 HC RX REV CODE- 250/636: Performed by: INTERNAL MEDICINE

## 2020-11-30 PROCEDURE — 74011250636 HC RX REV CODE- 250/636: Performed by: GENERAL ACUTE CARE HOSPITAL

## 2020-11-30 PROCEDURE — 83735 ASSAY OF MAGNESIUM: CPT

## 2020-11-30 PROCEDURE — 86140 C-REACTIVE PROTEIN: CPT

## 2020-11-30 PROCEDURE — 74011250637 HC RX REV CODE- 250/637: Performed by: INTERNAL MEDICINE

## 2020-11-30 PROCEDURE — 82962 GLUCOSE BLOOD TEST: CPT

## 2020-11-30 PROCEDURE — 85730 THROMBOPLASTIN TIME PARTIAL: CPT

## 2020-11-30 PROCEDURE — 94640 AIRWAY INHALATION TREATMENT: CPT

## 2020-11-30 PROCEDURE — 85025 COMPLETE CBC W/AUTO DIFF WBC: CPT

## 2020-11-30 PROCEDURE — 82728 ASSAY OF FERRITIN: CPT

## 2020-11-30 PROCEDURE — 74011250637 HC RX REV CODE- 250/637: Performed by: GENERAL ACUTE CARE HOSPITAL

## 2020-11-30 PROCEDURE — 65660000000 HC RM CCU STEPDOWN

## 2020-11-30 PROCEDURE — 85384 FIBRINOGEN ACTIVITY: CPT

## 2020-11-30 PROCEDURE — 80053 COMPREHEN METABOLIC PANEL: CPT

## 2020-11-30 PROCEDURE — 74011000250 HC RX REV CODE- 250: Performed by: GENERAL ACUTE CARE HOSPITAL

## 2020-11-30 RX ORDER — MAG HYDROX/ALUMINUM HYD/SIMETH 200-200-20
30 SUSPENSION, ORAL (FINAL DOSE FORM) ORAL
Status: DISCONTINUED | OUTPATIENT
Start: 2020-11-30 | End: 2020-12-02 | Stop reason: HOSPADM

## 2020-11-30 RX ADMIN — ASPIRIN 81 MG CHEWABLE TABLET 81 MG: 81 TABLET CHEWABLE at 10:02

## 2020-11-30 RX ADMIN — HUMAN INSULIN 12 UNITS: 100 INJECTION, SUSPENSION SUBCUTANEOUS at 19:05

## 2020-11-30 RX ADMIN — INSULIN LISPRO 3 UNITS: 100 INJECTION, SOLUTION INTRAVENOUS; SUBCUTANEOUS at 22:10

## 2020-11-30 RX ADMIN — HUMAN INSULIN 8 UNITS: 100 INJECTION, SUSPENSION SUBCUTANEOUS at 10:03

## 2020-11-30 RX ADMIN — REMDESIVIR 100 MG: 100 INJECTION, POWDER, LYOPHILIZED, FOR SOLUTION INTRAVENOUS at 16:57

## 2020-11-30 RX ADMIN — GUAIFENESIN 600 MG: 600 TABLET, EXTENDED RELEASE ORAL at 19:56

## 2020-11-30 RX ADMIN — INSULIN LISPRO 3 UNITS: 100 INJECTION, SOLUTION INTRAVENOUS; SUBCUTANEOUS at 10:03

## 2020-11-30 RX ADMIN — INSULIN LISPRO 9 UNITS: 100 INJECTION, SOLUTION INTRAVENOUS; SUBCUTANEOUS at 19:06

## 2020-11-30 RX ADMIN — Medication 10 ML: at 22:11

## 2020-11-30 RX ADMIN — BUDESONIDE AND FORMOTEROL FUMARATE DIHYDRATE 2 PUFF: 160; 4.5 AEROSOL RESPIRATORY (INHALATION) at 08:26

## 2020-11-30 RX ADMIN — BENZONATATE 200 MG: 100 CAPSULE ORAL at 19:55

## 2020-11-30 RX ADMIN — PANTOPRAZOLE SODIUM 40 MG: 40 TABLET, DELAYED RELEASE ORAL at 10:01

## 2020-11-30 RX ADMIN — CEFTRIAXONE 1 G: 1 INJECTION, POWDER, FOR SOLUTION INTRAMUSCULAR; INTRAVENOUS at 01:17

## 2020-11-30 RX ADMIN — DEXAMETHASONE SODIUM PHOSPHATE 6 MG: 4 INJECTION, SOLUTION INTRA-ARTICULAR; INTRALESIONAL; INTRAMUSCULAR; INTRAVENOUS; SOFT TISSUE at 10:03

## 2020-11-30 RX ADMIN — INSULIN LISPRO 5 UNITS: 100 INJECTION, SOLUTION INTRAVENOUS; SUBCUTANEOUS at 14:27

## 2020-11-30 RX ADMIN — METOPROLOL TARTRATE 25 MG: 25 TABLET, FILM COATED ORAL at 19:07

## 2020-11-30 RX ADMIN — ENOXAPARIN SODIUM 40 MG: 40 INJECTION SUBCUTANEOUS at 10:02

## 2020-11-30 RX ADMIN — METOPROLOL TARTRATE 25 MG: 25 TABLET, FILM COATED ORAL at 10:02

## 2020-11-30 RX ADMIN — OXYCODONE HYDROCHLORIDE AND ACETAMINOPHEN 1000 MG: 500 TABLET ORAL at 19:07

## 2020-11-30 RX ADMIN — Medication 10 ML: at 14:32

## 2020-11-30 RX ADMIN — ENOXAPARIN SODIUM 40 MG: 40 INJECTION SUBCUTANEOUS at 19:55

## 2020-11-30 RX ADMIN — AZITHROMYCIN MONOHYDRATE 500 MG: 500 INJECTION, POWDER, LYOPHILIZED, FOR SOLUTION INTRAVENOUS at 23:06

## 2020-11-30 RX ADMIN — ZINC SULFATE 220 MG (50 MG) CAPSULE 220 MG: CAPSULE at 10:02

## 2020-11-30 RX ADMIN — TIOTROPIUM BROMIDE INHALATION SPRAY 2 PUFF: 3.12 SPRAY, METERED RESPIRATORY (INHALATION) at 08:26

## 2020-11-30 RX ADMIN — ATORVASTATIN CALCIUM 40 MG: 40 TABLET, FILM COATED ORAL at 10:02

## 2020-11-30 RX ADMIN — OXYCODONE HYDROCHLORIDE AND ACETAMINOPHEN 1000 MG: 500 TABLET ORAL at 10:02

## 2020-11-30 RX ADMIN — Medication 10 ML: at 05:12

## 2020-11-30 RX ADMIN — GUAIFENESIN 600 MG: 600 TABLET, EXTENDED RELEASE ORAL at 10:02

## 2020-11-30 NOTE — PROGRESS NOTES
End of Shift Note    Bedside shift change report given to Aleksey Welsh RN (oncoming nurse) by Dariusz Harris (offgoing nurse). Report included the following information SBAR, Kardex, Intake/Output, MAR, Recent Results and Cardiac Rhythm NSR    Shift worked:  Day     Shift summary and any significant changes:     tolerating activity very fatigued     Concerns for physician to address:  very fatigued and wet nonproductive cough     Zone phone for oncoming shift:   [phone]       Activity:  Activity Level: Up ad abida  Number times ambulated in hallways past shift: 0  Number of times OOB to chair past shift: 4    Cardiac:   Cardiac Monitoring: Yes      Cardiac Rhythm: Normal sinus rhythm    Access:   Current line(s): PIV     Genitourinary:   Urinary status: voiding    Respiratory:   O2 Device: Nasal cannula  Chronic home O2 use?: NO  Incentive spirometer at bedside: NO     GI:  Last Bowel Movement Date: 11/28/20  Current diet:  DIET DIABETIC CONSISTENT CARB Regular  Passing flatus: YES  Tolerating current diet: YES       Pain Management:   Patient states pain is manageable on current regimen: YES    Skin:  Jose Score: 21  Interventions: increase time out of bed and limit briefs    Patient Safety:  Fall Score:  Total Score: 1  Interventions: gripper socks and stay with me (per policy)       Length of Stay:  Expected LOS: - - -  Actual LOS: 0      Dariusz Harris

## 2020-11-30 NOTE — PROGRESS NOTES
[facility] Consult Service Progress NOTE  Pulmonary, Critical Care, and Sleep Medicine    Name: Helen Oneal MRN: [id number]   : 1955 Hospital: [facility]   Date: 2020  Admission Date: 2020     Hospital Day: 3    Chart and notes reviewed. Data reviewed. I have evaluated and examined the patient. I have reviewed the notes from other providers and old records readily available and summarized findings below with the flowsheet. Pt is unstable and acutely ill. 2020 OOB in chair. No fever. Cough is what brought her in. chronically takes prednisone 5 mg a day, though recently increased this prior to admission to 10 mg a day. Poor sleep last night but after first infusion, reports better? No anosmia. Mild HA. No diarrhea. Many  Of her co workers now testing positive. Mask violuntary at work but everyone has temp tested and areas are isolated somewhat. Family not ill. IMPRESSION:   1. Acute on chronic hypoxic respiratory failure 96% on 2 L oxygen; on home O2 per PCP  2. Covid pneumonia CTA-airspace disease lower lobes, GGO right upper lobe  3. Covid rapid positive on  Elevated acute phase reactants-, CRP 5.45, ferritin 368,procalcitonin < 0.05  4. COPD, Symbicort and Spiriva - not acutely exacerbated  5. Chronic steroids Prednisone 5 mg daily for Allergic asthma  6. CHELSEA on CPAP at home and compliant  7. Past history of smoking quit smoking in    8. Atherosclerotic heart disease? ,   9. H/o breast cancer with right mastectomy ,   10. GERD   11. Diabetes type 2- uncontrolled and may worsenon steroids  12. Chronic Hypertension  13. Morbid obesity Body mass index is 40.74 kg/m². 14. Additional workup outlined below  15.  Multiorgan dysfunction as outlined above: Pt has one or more acute or chronic illnesses with severe exacerbation with progression or side effects of treatment that poses a threat to life or bodily function  16. Pt is unstable, unpredictable; at risk of sudden decline and decompensation. 16. Pt is requiring Drug therapy requiring intensive monitoring for toxicity      RECOMMENDATIONS/PLAN:   1. Remdesivir per ID  2. Dexamethasone  3. Consider convalescent plasma- pt cautious because she feels better and wants to go home in three days- potential side effects and risks explained and how it is still experimental. May not help her and may cause her to be worse. Will continue to assess and discuss  4. Supplemental O2 to keep sats > 93%  5. Prone positioning may not be possible  6. Labs to follow electrolytes, renal function, heaptic function and inflammatory markers  7. DVT prophylaxis  8. Bronchodilators per formulary availability  9. Pt needs IV fluids with additives and Drug therapy requiring intensive monitoring for toxicity  10. Prescription drug management with home med reconciliation done        Patient PCP: Sadiq Heller MD  Social History     Tobacco Use    Smoking status: Former Smoker     Last attempt to quit: 2011     Years since quittin.9   Substance Use Topics    Alcohol use: Yes     Comment: 2 beers per night      History reviewed. No pertinent family history.      Allergies   Allergen Reactions    Codeine Nausea and Vomiting and Vertigo        MAR reviewed and pertinent medications noted or modified as needed  Current Facility-Administered Medications   Medication    azithromycin (ZITHROMAX) 500 mg in 0.9% sodium chloride 250 mL (VIAL-MATE)    cefTRIAXone (ROCEPHIN) 1 g in 0.9% sodium chloride (MBP/ADV) 50 mL MBP    sodium chloride (NS) flush 5-40 mL    sodium chloride (NS) flush 5-40 mL    acetaminophen (TYLENOL) tablet 650 mg    Or    acetaminophen (TYLENOL) suppository 650 mg    polyethylene glycol (MIRALAX) packet 17 g    promethazine (PHENERGAN) tablet 12.5 mg    Or    ondansetron (ZOFRAN) injection 4 mg    ascorbic acid (vitamin C) (VITAMIN C) tablet 1,000 mg    zinc sulfate (ZINCATE) 220 (50) mg capsule 220 mg    acetaminophen (TYLENOL) suppository 650 mg    enoxaparin (LOVENOX) injection 40 mg    insulin lispro (HUMALOG) injection    glucose chewable tablet 16 g    dextrose (D50W) injection syrg 12.5-25 g    glucagon (GLUCAGEN) injection 1 mg    aspirin chewable tablet 81 mg    atorvastatin (LIPITOR) tablet 40 mg    metoprolol tartrate (LOPRESSOR) tablet 25 mg    pantoprazole (PROTONIX) tablet 40 mg    tiotropium bromide (SPIRIVA RESPIMAT) 2.5 mcg /actuation    dexamethasone (DECADRON) 4 mg/mL injection 6 mg    insulin NPH (NOVOLIN N, HUMULIN N) injection 8 Units    guaiFENesin ER (MUCINEX) tablet 600 mg    benzonatate (TESSALON) capsule 200 mg    ipratropium-albuterol (COMBIVENT RESPIMAT) 20 mcg-100 mcg inhalation spray    budesonide-formoteroL (SYMBICORT) 160-4.5 mcg/actuation HFA inhaler 2 Puff    remdesivir 100 mg in 0.9% sodium chloride 250 mL IVPB    oxyCODONE-acetaminophen (PERCOCET) 5-325 mg per tablet 1 Tab    fentaNYL citrate (PF) injection 25 mcg    inhalational spacing device        Vital Signs: Intake/Output: Intake/Output:   Visit Vitals  BP (!) 166/84   Pulse 75   Temp 97.7 °F (36.5 °C)   Resp 16   Ht 5' 3\" (1.6 m)   Wt 104.3 kg (230 lb)   SpO2 95%   BMI 40.74 kg/m²    Temp (24hrs), Av.7 °F (36.5 °C), Min:97.7 °F (36.5 °C), Max:97.8 °F (36.6 °C)        Telemetry: O2 Device: Nasal cannula  O2 Flow Rate (L/min): 2 l/min    Wt Readings from Last 4 Encounters:   20 104.3 kg (230 lb)   17 105.2 kg (232 lb)        No intake or output data in the 24 hours ending 20 1218    Last shift:      No intake/output data recorded. Last 3 shifts: No intake/output data recorded. Physical Exam:    General: alert, oriented times 3 and moderately ill;   female; 2NC O2,    HEAD: Normocephalic, without obvious abnormality, atraumatic, syndromic facies   EYES: conjunctivae clear.  PERRL, AN Icteric sclerae   NOSE: nares normal, no drainage, no flaring,    THROAT: mucous membranes moist; Lips, mucosa dry; No Thrush; class 4 airway; Neck: Supple, symmetrical, trachea midline,  No accessory mm use; No goiter or thyroid tenderness   LYMPH: No abnormally enlarged lymph nodes. in cervical or supraclavicular regions   Chest: increased AP diameter   Lungs: no wheeze   Heart: Regular rate and rhythm ; NO edema   Abdomen: soft, non-tender, without masses or organomegaly, OBESE   : no  Geiger,     Extremity: negative, cyanosis, clubbing   Neuro: alert; speech fluent; unable to check gait and station; does follow simple commands   Psych: oriented to time, place and person; No agitation; normal affect   Skin: Skin unremarkable;    Pulses:Bilateral   Capillary refill: normal; well perfused,        Recent Labs     11/30/20 0245 11/29/20 0033 11/28/20  1746   WBC 8.2  --  7.7   HGB 13.5  --  14.9     --  162   INR  --  1.1  --    APTT 25.9 25.2  --      Recent Labs     11/30/20 0245 11/28/20  1746    135*   K 3.9 3.5    103   CO2 25 23   * 208*   BUN 16 13   CREA 0.89 1.16*   CA 8.8 8.9   MG 2.0  --    ALB 2.6* 2.9*   ALT 41 55     No results for input(s): PH, PCO2, PO2, HCO3, FIO2 in the last 72 hours. Recent Labs     11/28/20  1746   TROIQ <0.05     No results found for: BNPP, BNP   Lab Results   Component Value Date/Time    Culture result:  11/29/2020 12:26 AM     1 OF 4 BOTTLES HAS BEEN SUBMITTED TO [facility] FOR IDENTIFICATION AND SENSITIVITIES    Culture result: REMAINING BOTTLE(S) HAS/HAVE NO GROWTH SO FAR 11/29/2020 12:26 AM     No results found for: TSH, TSHEXT    Imaging:     CXR Results  (Last 48 hours)               11/28/20 1852  XR CHEST PORT Final result    Impression:  Impression:   1. Enlarged cardiac silhouette, otherwise no acute disease           Narrative:  INDICATION:  SOB        EXAM: Single portable view of chest 1846 . Comparison:5/23/2017       Findings: Cardiac silhouette is enlarged.  Pulmonary vasculature is not engorged. There are no focal parenchymal opacities, effusions, or pneumothorax. Results from [place] encounter on 11/28/20   XR CHEST PORT    Narrative INDICATION:  SOB     EXAM: Single portable view of chest 1846 . Comparison:5/23/2017    Findings: Cardiac silhouette is enlarged. Pulmonary vasculature is not engorged. There are no focal parenchymal opacities, effusions, or pneumothorax. Impression Impression:  1. Enlarged cardiac silhouette, otherwise no acute disease       Results from Hospital Encounter encounter on 11/28/20   XR CHEST PORT    Narrative INDICATION:  SOB     EXAM: Single portable view of chest 1846 . Comparison:5/23/2017    Findings: Cardiac silhouette is enlarged. Pulmonary vasculature is not engorged. There are no focal parenchymal opacities, effusions, or pneumothorax. Impression Impression:  1. Enlarged cardiac silhouette, otherwise no acute disease     Results from Hospital Encounter encounter on 05/23/17   XR CHEST PORT    Narrative INDICATION: Lower mid back and chest pain with tremors    COMPARISON: None    A single frontal view was obtained. The time of this study is 2058 hours. Lungs  are clear. Heart size is at the upper limits of normal for this type of  projection. Mediastinal shadows are normal..           Impression IMPRESSION:  No acute finding     Results from Hospital Encounter encounter on 11/28/20   CTA CHEST W OR W WO CONT    Narrative History: Hypoxia. CTA of the chest was performed. 100 mL Isovue-370 were injected and scanning  was performed during the arterial phase of contrast administration. Post  processing was performed. 3D reconstructions were performed. CT dose reduction  was achieved through use of a standardized protocol tailored for this  examination and automatic exposure control for dose modulation. There are no intraluminal filling defects to suggest pulmonary embolism.  The  heart, pericardium, and great vessels appear unremarkable. The chest wall and  axilla appear unremarkable. There is mild opacification in the lower lobes  posteriorly and there is some minimal peripheral groundglass opacification in  the upper lobes. Impression IMPRESSION:  1. No evidence for pulmonary embolism. 2. Minimal airspace disease at the in the lower lobes and minimal groundglass  opacification in the right upper lobe.             My assessment/management discussed with: Consultants, Nursing, Respiratory Therapy, Hospitalist and Family for coordination of care    Medical Decision Making Today:  · Reviewed the flowsheet and previous days notes  · Reviewed and summarized records or history from previous days note or discussions with staff, family  · Parenteral controlled substances - Reviewed/ Adjusted / Weaned / Started  · High Risk Drug therapy requiring intensive monitoring for toxicity: eg steroids, pressors, antibiotics  · Reviewed and/or ordered Clinical lab tests  · Reviewed and/or ordered Radiology tests  · Reviewed and/or ordered of Medicine tests  · Independently visualized radiologic Images  · I have personally reviewed the patients ECG / Telemetry  · Reviewed and/or adjusted Ventilator / NiPPV settings      Alex Olson MD

## 2020-11-30 NOTE — PROGRESS NOTES
End of Shift Note    Bedside shift change report given to LORENA Burleson (oncoming nurse) by Elle Pate RN (offgoing nurse). Report included the following information SBAR, Kardex, ED Summary, MAR, Accordion and Recent Results    Shift worked:  7p-7a     Shift summary and any significant changes:     Patient slept good last night     Concerns for physician to address:  None     Zone phone for oncoming shift:   [phone]       Activity:  Activity Level: Up ad abida  Number times ambulated in hallways past shift: 2  Number of times OOB to chair past shift: 2    Cardiac:   Cardiac Monitoring: Yes      Cardiac Rhythm: Normal sinus rhythm    Access:   Current line(s): PIV     Genitourinary:   Urinary status: voiding    Respiratory:   O2 Device: Nasal cannula  Chronic home O2 use?: YES  Incentive spirometer at bedside: YES     GI:  Last Bowel Movement Date: 11/29/20  Current diet:  DIET DIABETIC CONSISTENT CARB Regular  Passing flatus: YES  Tolerating current diet: YES       Pain Management:   Patient states pain is manageable on current regimen: YES    Skin:  Jose Score: 21  Interventions: turn team, speciality bed and increase time out of bed    Patient Safety:  Fall Score:  Total Score: 1  Interventions: bed/chair alarm, assistive device (walker, cane, etc) and pt to call before getting OOB       Length of Stay:  Expected LOS: - - -  Actual LOS: 1      Elle Pate RN

## 2020-11-30 NOTE — PROGRESS NOTES
Problem: Airway Clearance - Ineffective  Goal: Achieve or maintain patent airway  Outcome: Progressing Towards Goal     Problem: Gas Exchange - Impaired  Goal: Absence of hypoxia  Outcome: Progressing Towards Goal  Goal: Promote optimal lung function  Outcome: Progressing Towards Goal     Problem: Breathing Pattern - Ineffective  Goal: Ability to achieve and maintain a regular respiratory rate  Outcome: Progressing Towards Goal     Problem: Body Temperature -  Risk of, Imbalanced  Goal: Ability to maintain a body temperature within defined limits  Outcome: Progressing Towards Goal  Goal: Will regain or maintain usual level of consciousness  Outcome: Progressing Towards Goal  Goal: Complications related to the disease process, condition or treatment will be avoided or minimized  Outcome: Progressing Towards Goal     Problem: Isolation Precautions - Risk of Spread of Infection  Goal: Prevent transmission of infectious organism to others  Outcome: Progressing Towards Goal     Problem: Nutrition Deficits  Goal: Optimize nutrtional status  Outcome: Progressing Towards Goal     Problem: Risk for Fluid Volume Deficit  Goal: Maintain normal heart rhythm  Outcome: Progressing Towards Goal  Goal: Maintain absence of muscle cramping  Outcome: Progressing Towards Goal  Goal: Maintain normal serum potassium, sodium, calcium, phosphorus, and pH  Outcome: Progressing Towards Goal     Problem: Loneliness or Risk for Loneliness  Goal: Demonstrate positive use of time alone when socialization is not possible  Outcome: Progressing Towards Goal     Problem: Fatigue  Goal: Verbalize increase energy and improved vitality  Outcome: Progressing Towards Goal     Problem: Patient Education: Go to Patient Education Activity  Goal: Patient/Family Education  Outcome: Progressing Towards Goal     Problem: Falls - Risk of  Goal: *Absence of Falls  Description: Document Alida Fall Risk and appropriate interventions in the flowsheet.   Outcome: Progressing Towards Goal  Note: Fall Risk Interventions:            Medication Interventions: Teach patient to arise slowly

## 2020-11-30 NOTE — PROGRESS NOTES
Lab called and said that the paired blood culture collected at 0026 ib 11/29 tested positive for gram + cocci in chains in one out of four bottles from the left AC. Will inform Dr. Imtiaz Valenzuela of this new finding.

## 2020-12-01 LAB
ALBUMIN SERPL-MCNC: 2.5 G/DL (ref 3.5–5)
ALBUMIN/GLOB SERPL: 0.7 {RATIO} (ref 1.1–2.2)
ALP SERPL-CCNC: 83 U/L (ref 45–117)
ALT SERPL-CCNC: 38 U/L (ref 12–78)
ANION GAP SERPL CALC-SCNC: 5 MMOL/L (ref 5–15)
APTT PPP: 23.6 SEC (ref 22.1–31)
AST SERPL-CCNC: 24 U/L (ref 15–37)
BILIRUB SERPL-MCNC: 0.4 MG/DL (ref 0.2–1)
BUN SERPL-MCNC: 16 MG/DL (ref 6–20)
BUN/CREAT SERPL: 15 (ref 12–20)
CALCIUM SERPL-MCNC: 8.7 MG/DL (ref 8.5–10.1)
CHLORIDE SERPL-SCNC: 109 MMOL/L (ref 97–108)
CO2 SERPL-SCNC: 26 MMOL/L (ref 21–32)
CREAT SERPL-MCNC: 1.04 MG/DL (ref 0.55–1.02)
ERYTHROCYTE [DISTWIDTH] IN BLOOD BY AUTOMATED COUNT: 14.5 % (ref 11.5–14.5)
GLOBULIN SER CALC-MCNC: 3.7 G/DL (ref 2–4)
GLUCOSE BLD STRIP.AUTO-MCNC: 175 MG/DL (ref 65–100)
GLUCOSE BLD STRIP.AUTO-MCNC: 212 MG/DL (ref 65–100)
GLUCOSE BLD STRIP.AUTO-MCNC: 327 MG/DL (ref 65–100)
GLUCOSE BLD STRIP.AUTO-MCNC: 333 MG/DL (ref 65–100)
GLUCOSE SERPL-MCNC: 197 MG/DL (ref 65–100)
HCT VFR BLD AUTO: 43.2 % (ref 35–47)
HGB BLD-MCNC: 13.6 G/DL (ref 11.5–16)
MCH RBC QN AUTO: 26 PG (ref 26–34)
MCHC RBC AUTO-ENTMCNC: 31.5 G/DL (ref 30–36.5)
MCV RBC AUTO: 82.6 FL (ref 80–99)
NRBC # BLD: 0 K/UL (ref 0–0.01)
NRBC BLD-RTO: 0 PER 100 WBC
PLATELET # BLD AUTO: 235 K/UL (ref 150–400)
PMV BLD AUTO: 11.1 FL (ref 8.9–12.9)
POTASSIUM SERPL-SCNC: 3.7 MMOL/L (ref 3.5–5.1)
PROT SERPL-MCNC: 6.2 G/DL (ref 6.4–8.2)
RBC # BLD AUTO: 5.23 M/UL (ref 3.8–5.2)
SERVICE CMNT-IMP: ABNORMAL
SODIUM SERPL-SCNC: 140 MMOL/L (ref 136–145)
THERAPEUTIC RANGE,PTTT: NORMAL SECS (ref 58–77)
WBC # BLD AUTO: 9.2 K/UL (ref 3.6–11)

## 2020-12-01 PROCEDURE — 85027 COMPLETE CBC AUTOMATED: CPT

## 2020-12-01 PROCEDURE — 36415 COLL VENOUS BLD VENIPUNCTURE: CPT

## 2020-12-01 PROCEDURE — 74011000258 HC RX REV CODE- 258: Performed by: GENERAL ACUTE CARE HOSPITAL

## 2020-12-01 PROCEDURE — 74011636637 HC RX REV CODE- 636/637: Performed by: STUDENT IN AN ORGANIZED HEALTH CARE EDUCATION/TRAINING PROGRAM

## 2020-12-01 PROCEDURE — 74011636637 HC RX REV CODE- 636/637: Performed by: INTERNAL MEDICINE

## 2020-12-01 PROCEDURE — 65660000000 HC RM CCU STEPDOWN

## 2020-12-01 PROCEDURE — 82962 GLUCOSE BLOOD TEST: CPT

## 2020-12-01 PROCEDURE — 85730 THROMBOPLASTIN TIME PARTIAL: CPT

## 2020-12-01 PROCEDURE — 94760 N-INVAS EAR/PLS OXIMETRY 1: CPT

## 2020-12-01 PROCEDURE — 74011250637 HC RX REV CODE- 250/637: Performed by: GENERAL ACUTE CARE HOSPITAL

## 2020-12-01 PROCEDURE — 74011250637 HC RX REV CODE- 250/637: Performed by: INTERNAL MEDICINE

## 2020-12-01 PROCEDURE — 77010033678 HC OXYGEN DAILY

## 2020-12-01 PROCEDURE — 74011000250 HC RX REV CODE- 250: Performed by: GENERAL ACUTE CARE HOSPITAL

## 2020-12-01 PROCEDURE — 74011250636 HC RX REV CODE- 250/636: Performed by: GENERAL ACUTE CARE HOSPITAL

## 2020-12-01 PROCEDURE — 80053 COMPREHEN METABOLIC PANEL: CPT

## 2020-12-01 PROCEDURE — 74011250636 HC RX REV CODE- 250/636: Performed by: INTERNAL MEDICINE

## 2020-12-01 PROCEDURE — 94640 AIRWAY INHALATION TREATMENT: CPT

## 2020-12-01 RX ADMIN — ZINC SULFATE 220 MG (50 MG) CAPSULE 220 MG: CAPSULE at 08:24

## 2020-12-01 RX ADMIN — Medication 10 ML: at 05:36

## 2020-12-01 RX ADMIN — METOPROLOL TARTRATE 25 MG: 25 TABLET, FILM COATED ORAL at 17:16

## 2020-12-01 RX ADMIN — ENOXAPARIN SODIUM 40 MG: 40 INJECTION SUBCUTANEOUS at 08:23

## 2020-12-01 RX ADMIN — PANTOPRAZOLE SODIUM 40 MG: 40 TABLET, DELAYED RELEASE ORAL at 08:22

## 2020-12-01 RX ADMIN — ATORVASTATIN CALCIUM 40 MG: 40 TABLET, FILM COATED ORAL at 08:22

## 2020-12-01 RX ADMIN — METOPROLOL TARTRATE 25 MG: 25 TABLET, FILM COATED ORAL at 08:23

## 2020-12-01 RX ADMIN — BENZONATATE 200 MG: 100 CAPSULE ORAL at 00:50

## 2020-12-01 RX ADMIN — TIOTROPIUM BROMIDE INHALATION SPRAY 2 PUFF: 3.12 SPRAY, METERED RESPIRATORY (INHALATION) at 08:43

## 2020-12-01 RX ADMIN — ENOXAPARIN SODIUM 40 MG: 40 INJECTION SUBCUTANEOUS at 20:19

## 2020-12-01 RX ADMIN — INSULIN LISPRO 4 UNITS: 100 INJECTION, SOLUTION INTRAVENOUS; SUBCUTANEOUS at 22:13

## 2020-12-01 RX ADMIN — GUAIFENESIN 600 MG: 600 TABLET, EXTENDED RELEASE ORAL at 08:22

## 2020-12-01 RX ADMIN — BUDESONIDE AND FORMOTEROL FUMARATE DIHYDRATE 2 PUFF: 160; 4.5 AEROSOL RESPIRATORY (INHALATION) at 08:43

## 2020-12-01 RX ADMIN — OXYCODONE HYDROCHLORIDE AND ACETAMINOPHEN 1000 MG: 500 TABLET ORAL at 17:16

## 2020-12-01 RX ADMIN — OXYCODONE HYDROCHLORIDE AND ACETAMINOPHEN 1000 MG: 500 TABLET ORAL at 08:22

## 2020-12-01 RX ADMIN — BENZONATATE 200 MG: 100 CAPSULE ORAL at 20:19

## 2020-12-01 RX ADMIN — REMDESIVIR 100 MG: 100 INJECTION, POWDER, LYOPHILIZED, FOR SOLUTION INTRAVENOUS at 17:16

## 2020-12-01 RX ADMIN — AZITHROMYCIN MONOHYDRATE 500 MG: 500 INJECTION, POWDER, LYOPHILIZED, FOR SOLUTION INTRAVENOUS at 23:08

## 2020-12-01 RX ADMIN — DEXAMETHASONE SODIUM PHOSPHATE 6 MG: 4 INJECTION, SOLUTION INTRA-ARTICULAR; INTRALESIONAL; INTRAMUSCULAR; INTRAVENOUS; SOFT TISSUE at 08:23

## 2020-12-01 RX ADMIN — ASPIRIN 81 MG CHEWABLE TABLET 81 MG: 81 TABLET CHEWABLE at 08:22

## 2020-12-01 RX ADMIN — HUMAN INSULIN 8 UNITS: 100 INJECTION, SUSPENSION SUBCUTANEOUS at 08:23

## 2020-12-01 RX ADMIN — INSULIN LISPRO 3 UNITS: 100 INJECTION, SOLUTION INTRAVENOUS; SUBCUTANEOUS at 11:36

## 2020-12-01 RX ADMIN — Medication 10 ML: at 08:22

## 2020-12-01 RX ADMIN — ACETAMINOPHEN 650 MG: 325 TABLET ORAL at 20:19

## 2020-12-01 RX ADMIN — GUAIFENESIN 600 MG: 600 TABLET, EXTENDED RELEASE ORAL at 20:19

## 2020-12-01 RX ADMIN — CEFTRIAXONE 1 G: 1 INJECTION, POWDER, FOR SOLUTION INTRAMUSCULAR; INTRAVENOUS at 00:23

## 2020-12-01 RX ADMIN — BUDESONIDE AND FORMOTEROL FUMARATE DIHYDRATE 2 PUFF: 160; 4.5 AEROSOL RESPIRATORY (INHALATION) at 19:14

## 2020-12-01 RX ADMIN — Medication 10 ML: at 20:20

## 2020-12-01 RX ADMIN — HUMAN INSULIN 12 UNITS: 100 INJECTION, SUSPENSION SUBCUTANEOUS at 17:17

## 2020-12-01 RX ADMIN — INSULIN LISPRO 3 UNITS: 100 INJECTION, SOLUTION INTRAVENOUS; SUBCUTANEOUS at 17:18

## 2020-12-01 RX ADMIN — Medication 10 ML: at 13:17

## 2020-12-01 NOTE — PROGRESS NOTES
1835 Patient's dinner time BS is 360. Messaged Dr. Malik Caldera via [employer] to notify him of the BS. He would like to give the patient 9 units of insulin lispro and 12 units of NPH.

## 2020-12-01 NOTE — PROGRESS NOTES
Reason for Admission:   COVID-19 pneumonia                   RUR Score:   14%                  Plan for utilizing home health:   Not indicated at this time        PCP: First and Last name:  Viji Johnson    Name of Practice:    Are you a current patient: Yes/No:  Yes    Approximate date of last visit:  2nd week in November    Can you participate in a virtual visit with your PCP:  Yes                     Current Advanced Directive/Advance Care Plan: not on file at this time                          Transition of Care Plan:       Due to COVID-19 restrictions, CM contacted Pt via phone. Pt was alert and oriented x4 during initial assessment. Pt is reported to live in a single story home with her  4 steps at the entrance. Pt is independent at baseline, but uses a cane occasionally when she has \"knee problems. \"     Pt reports that she drives to work. Pt reports that she has an inogen portable tank at home which she uses as needed. Pt reports that she uses [facility] in [place]. Pt denies Hx of HH and Rehab in the past.         Care Management Interventions  PCP Verified by CM:  Yes  Transition of Care Consult (CM Consult): Discharge Planning  Current Support Network: Lives with Spouse, Own Home  Confirm Follow Up Transport: Family  Discharge Location  Discharge Placement: [address], [place], [address] [place] [address]

## 2020-12-01 NOTE — PROGRESS NOTES
End of Shift Note    Bedside shift change report given to [facility] BRITTA MANJARREZ (oncoming nurse) by Alison Ramírez (offgoing nurse). Report included the following information SBAR, Kardex, Intake/Output, MAR and Accordion    Shift worked:  Day     Shift summary and any significant changes:          Concerns for physician to address:       Zone phone for oncoming shift:          Activity:  Activity Level: Up ad abida  Number times ambulated in hallways past shift: 0  Number of times OOB to chair past shift: 1    Cardiac:   Cardiac Monitoring: Yes      Cardiac Rhythm: Normal sinus rhythm    Access:   Current line(s): PIV     Genitourinary:   Urinary status: voiding    Respiratory:   O2 Device: Nasal cannula  Chronic home O2 use?: NO  Incentive spirometer at bedside: NO     GI:  Last Bowel Movement Date: 11/29/20  Current diet:  DIET DIABETIC CONSISTENT CARB Regular  Passing flatus: YES  Tolerating current diet: YES       Pain Management:   Patient states pain is manageable on current regimen: N/A    Skin:  Jose Score: 21  Interventions: increase time out of bed    Patient Safety:  Fall Score:  Total Score: 1  Interventions: gripper socks and pt to call before getting OOB       Length of Stay:  Expected LOS: 5d 12h  Actual LOS: [facility]

## 2020-12-01 NOTE — PROGRESS NOTES
Satish Hickman RN received a call from the lab. That stated that the patient had a paired blood culture done on 11/29/2020. The patient had gram positive cocci in chains in 1 out of 4 of the bottles. Sosa Pulliamers via [employer] to notify him of the results.

## 2020-12-01 NOTE — PROGRESS NOTES
End of Shift Note    Bedside shift change report given to Riya Granger RN (oncoming nurse) by Rinku Mccarthy RN (offgoing nurse). Report included the following information SBAR, Kardex, ED Summary, Intake/Output, MAR, Accordion and Recent Results    Shift worked:  7p-7a     Shift summary and any significant changes:     None     Concerns for physician to address:  None     Zone phone for oncoming shift:   [id number]       Activity:  Activity Level: Up ad abida  Number times ambulated in hallways past shift: 2  Number of times OOB to chair past shift: 3    Cardiac:   Cardiac Monitoring: Yes      Cardiac Rhythm: Normal sinus rhythm    Access:   Current line(s): PIV     Genitourinary:   Urinary status: voiding    Respiratory:   O2 Device: Nasal cannula  Chronic home O2 use?: YES  Incentive spirometer at bedside: YES     GI:  Last Bowel Movement Date: 11/29/20  Current diet:  DIET DIABETIC CONSISTENT CARB Regular  Passing flatus: YES  Tolerating current diet: YES       Pain Management:   Patient states pain is manageable on current regimen: YES    Skin:  Jose Score: 21  Interventions: turn team and increase time out of bed    Patient Safety:  Fall Score:  Total Score: 1  Interventions: pt to call before getting OOB       Length of Stay:  Expected LOS: 5d 12h  Actual LOS: [address], RN

## 2020-12-01 NOTE — PROGRESS NOTES
[facility] Consult Service Progress NOTE  Pulmonary, Critical Care, and Sleep Medicine    Name: Misha Reed MRN: [id number]   : 1955 Hospital: [facility]   Date: 2020  Admission Date: 2020     Hospital Day: 4    Chart and notes reviewed. Data reviewed. I have evaluated and examined the patient. I have reviewed the notes from other providers and old records readily available and summarized findings below with the flowsheet. Pt is unstable and acutely ill. 2020 OOB in chair. No fever. Cough is what brought her in. chronically takes prednisone 5 mg a day, though recently increased this prior to admission to 10 mg a day. Poor sleep last night but after first infusion, reports better? No anosmia. Mild HA. No diarrhea. Many  Of her co workers now testing positive. Mask violuntary at work but everyone has temp tested and areas are isolated somewhat. Family not ill. 2020: alpha strep in blood . Now OOB in chair. Still needs O2. Has CPAP at home     IMPRESSION:   1. Acute on chronic hypoxic respiratory failure 96% on 2 L oxygen; on home O2 per PCP  2. Covid pneumonia CTA-airspace disease lower lobes, GGO right upper lobe  3. Alpha strep bacteremia- source? 4. Covid rapid positive on  Elevated acute phase reactants-, CRP 5.45, ferritin 368,procalcitonin < 0.05  5. COPD, Symbicort and Spiriva - not acutely exacerbated  6. Chronic steroids Prednisone 5 mg daily for Allergic asthma  7. CHELSEA on CPAP at home and compliant  8. Past history of smoking quit smoking in    9. Atherosclerotic heart disease? ,   10. H/o breast cancer with right mastectomy ,   11. GERD   12. Diabetes type 2- uncontrolled and may worsenon steroids  13. Chronic Hypertension  14. Morbid obesity Body mass index is 40.74 kg/m². 15. Additional workup outlined below  16.  Multiorgan dysfunction as outlined above: Pt has one or more acute or chronic illnesses with severe exacerbation with progression or side effects of treatment that poses a threat to life or bodily function  17. Pt is unstable, unpredictable; at risk of sudden decline and decompensation. 25. Pt is requiring Drug therapy requiring intensive monitoring for toxicity      RECOMMENDATIONS/PLAN:   1. Remdesivir per ID  2. Dexamethasone  3. Antibiotics- per culture; source? Echo? 4. Consider convalescent plasma- pt cautious because she feels better and wants to go home in three days- potential side effects and risks explained and how it is still experimental. May not help her and may cause her to be worse. Will continue to assess and discuss  5. Supplemental O2 to keep sats > 93%  6. Prone positioning may not be possible  7. Labs to follow electrolytes, renal function, heaptic function and inflammatory markers  8. DVT prophylaxis  9. Bronchodilators per formulary availability  10. Pt needs IV fluids with additives and Drug therapy requiring intensive monitoring for toxicity  11. Prescription drug management with home med reconciliation done        Patient PCP: Mane Davis MD  Social History     Tobacco Use    Smoking status: Former Smoker     Last attempt to quit: 2011     Years since quittin.9   Substance Use Topics    Alcohol use: Yes     Comment: 2 beers per night      History reviewed. No pertinent family history.      Allergies   Allergen Reactions    Codeine Nausea and Vomiting and Vertigo        MAR reviewed and pertinent medications noted or modified as needed  Current Facility-Administered Medications   Medication    insulin NPH (NOVOLIN N, HUMULIN N) injection 12 Units    alum-mag hydroxide-simeth (MYLANTA) oral suspension 30 mL    influenza vaccine 2020- (6 mos+)(PF) (FLUARIX/FLULAVAL/FLUZONE QUAD) injection 0.5 mL    azithromycin (ZITHROMAX) 500 mg in 0.9% sodium chloride 250 mL (VIAL-MATE)    cefTRIAXone (ROCEPHIN) 1 g in 0.9% sodium chloride (MBP/ADV) 50 mL MBP    sodium chloride (NS) flush 5-40 mL    sodium chloride (NS) flush 5-40 mL    acetaminophen (TYLENOL) tablet 650 mg    Or    acetaminophen (TYLENOL) suppository 650 mg    polyethylene glycol (MIRALAX) packet 17 g    promethazine (PHENERGAN) tablet 12.5 mg    Or    ondansetron (ZOFRAN) injection 4 mg    ascorbic acid (vitamin C) (VITAMIN C) tablet 1,000 mg    zinc sulfate (ZINCATE) 220 (50) mg capsule 220 mg    acetaminophen (TYLENOL) suppository 650 mg    enoxaparin (LOVENOX) injection 40 mg    insulin lispro (HUMALOG) injection    glucose chewable tablet 16 g    dextrose (D50W) injection syrg 12.5-25 g    glucagon (GLUCAGEN) injection 1 mg    aspirin chewable tablet 81 mg    atorvastatin (LIPITOR) tablet 40 mg    metoprolol tartrate (LOPRESSOR) tablet 25 mg    pantoprazole (PROTONIX) tablet 40 mg    tiotropium bromide (SPIRIVA RESPIMAT) 2.5 mcg /actuation    dexamethasone (DECADRON) 4 mg/mL injection 6 mg    guaiFENesin ER (MUCINEX) tablet 600 mg    benzonatate (TESSALON) capsule 200 mg    ipratropium-albuterol (COMBIVENT RESPIMAT) 20 mcg-100 mcg inhalation spray    budesonide-formoteroL (SYMBICORT) 160-4.5 mcg/actuation HFA inhaler 2 Puff    remdesivir 100 mg in 0.9% sodium chloride 250 mL IVPB    oxyCODONE-acetaminophen (PERCOCET) 5-325 mg per tablet 1 Tab    fentaNYL citrate (PF) injection 25 mcg    inhalational spacing device        Vital Signs: Intake/Output: Intake/Output:   Visit Vitals  BP (!) 153/80 (BP 1 Location: Left leg, BP Patient Position: At rest)   Pulse 70   Temp 98.3 °F (36.8 °C)   Resp 16   Ht 5' 3\" (1.6 m)   Wt 104.3 kg (230 lb)   SpO2 97%   BMI 40.74 kg/m²    Temp (24hrs), Av °F (36.7 °C), Min:97.6 °F (36.4 °C), Max:98.3 °F (36.8 °C)        Telemetry: O2 Device: Room air  O2 Flow Rate (L/min): 3 l/min    Wt Readings from Last 4 Encounters:   20 104.3 kg (230 lb)   17 105.2 kg (232 lb)        No intake or output data in the 24 hours ending 20 1449    Last shift:      No intake/output data recorded. Last 3 shifts: No intake/output data recorded. Physical Exam:    General: alert, oriented times 3 and moderately ill;   female; 2NC O2,    HEAD: Normocephalic, without obvious abnormality, atraumatic, syndromic facies   EYES: conjunctivae clear. PERRL, AN Icteric sclerae   NOSE: nares normal, no drainage, no flaring,    THROAT: mucous membranes moist; Lips, mucosa dry; No Thrush; class 4 airway; Neck: Supple, symmetrical, trachea midline,  No accessory mm use; No goiter or thyroid tenderness   LYMPH: No abnormally enlarged lymph nodes. in cervical or supraclavicular regions   Chest: increased AP diameter   Lungs: no wheeze   Heart: Regular rate and rhythm ; NO edema   Abdomen: soft, non-tender, without masses or organomegaly, OBESE   : no  Geiger,     Extremity: negative, cyanosis, clubbing   Neuro: alert; speech fluent; unable to check gait and station; does follow simple commands   Psych: oriented to time, place and person; No agitation; normal affect   Skin: Skin unremarkable;    Pulses:Bilateral   Capillary refill: normal; well perfused,        Recent Labs     12/01/20 0429 11/30/20 0245 11/29/20 0033 11/28/20  1746   WBC 9.2 8.2  --  7.7   HGB 13.6 13.5  --  14.9    189  --  162   INR  --   --  1.1  --    APTT 23.6 25.9 25.2  --      Recent Labs     12/01/20 0429 11/30/20 0245 11/28/20  1746    138 135*   K 3.7 3.9 3.5   * 107 103   CO2 26 25 23   * 254* 208*   BUN 16 16 13   CREA 1.04* 0.89 1.16*   CA 8.7 8.8 8.9   MG  --  2.0  --    ALB 2.5* 2.6* 2.9*   ALT 38 41 55     No results for input(s): PH, PCO2, PO2, HCO3, FIO2 in the last 72 hours. Recent Labs     11/28/20 1746   TROIQ <0.05     No results found for: BNPP, BNP   Lab Results   Component Value Date/Time    Culture result: (A) 11/29/2020 12:26 AM     ALPHA STREPTOCOCCUS, NOT S.  PNEUMONIAE GROWING IN 1 OF 4 BOTTLES DRAWN (SITE = [facility] [id number]) Culture result: REMAINING BOTTLE(S) HAS/HAVE NO GROWTH SO FAR 11/29/2020 12:26 AM     No results found for: TSH, TSHEXT, TSHEXT    Imaging:     CXR Results  (Last 48 hours)    None         Results from Hospital Encounter encounter on 11/28/20   XR CHEST PORT    Narrative INDICATION:  SOB     EXAM: Single portable view of chest 1846 . Comparison:5/23/2017    Findings: Cardiac silhouette is enlarged. Pulmonary vasculature is not engorged. There are no focal parenchymal opacities, effusions, or pneumothorax. Impression Impression:  1. Enlarged cardiac silhouette, otherwise no acute disease       Results from Hospital Encounter encounter on 11/28/20   XR CHEST PORT    Narrative INDICATION:  SOB     EXAM: Single portable view of chest 1846 . Comparison:5/23/2017    Findings: Cardiac silhouette is enlarged. Pulmonary vasculature is not engorged. There are no focal parenchymal opacities, effusions, or pneumothorax. Impression Impression:  1. Enlarged cardiac silhouette, otherwise no acute disease     Results from Hospital Encounter encounter on 05/23/17   XR CHEST PORT    Narrative INDICATION: Lower mid back and chest pain with tremors    COMPARISON: None    A single frontal view was obtained. The time of this study is 2058 hours. Lungs  are clear. Heart size is at the upper limits of normal for this type of  projection. Mediastinal shadows are normal..           Impression IMPRESSION:  No acute finding     Results from Hospital Encounter encounter on 11/28/20   CTA CHEST W OR W WO CONT    Narrative History: Hypoxia. CTA of the chest was performed. 100 mL Isovue-370 were injected and scanning  was performed during the arterial phase of contrast administration. Post  processing was performed. 3D reconstructions were performed. CT dose reduction  was achieved through use of a standardized protocol tailored for this  examination and automatic exposure control for dose modulation.       There are no intraluminal filling defects to suggest pulmonary embolism. The  heart, pericardium, and great vessels appear unremarkable. The chest wall and  axilla appear unremarkable. There is mild opacification in the lower lobes  posteriorly and there is some minimal peripheral groundglass opacification in  the upper lobes. Impression IMPRESSION:  1. No evidence for pulmonary embolism. 2. Minimal airspace disease at the in the lower lobes and minimal groundglass  opacification in the right upper lobe.             My assessment/management discussed with: Consultants, Nursing, Respiratory Therapy, Hospitalist and Family for coordination of care    Medical Decision Making Today:  · Reviewed the flowsheet and previous days notes  · Reviewed and summarized records or history from previous days note or discussions with staff, family  · Parenteral controlled substances - Reviewed/ Adjusted / Weaned / Started  · High Risk Drug therapy requiring intensive monitoring for toxicity: eg steroids, pressors, antibiotics  · Reviewed and/or ordered Clinical lab tests  · Reviewed and/or ordered Radiology tests  · Reviewed and/or ordered of Medicine tests  · Independently visualized radiologic Images  · I have personally reviewed the patients ECG / Telemetry  · Reviewed and/or adjusted Ventilator / NiPPV settings      Rosa Peabody, MD

## 2020-12-01 NOTE — PROGRESS NOTES
Problem: Airway Clearance - Ineffective  Goal: Achieve or maintain patent airway  Outcome: Progressing Towards Goal     Problem: Gas Exchange - Impaired  Goal: Absence of hypoxia  Outcome: Progressing Towards Goal     Problem:  Body Temperature -  Risk of, Imbalanced  Goal: Ability to maintain a body temperature within defined limits  Outcome: Progressing Towards Goal     Problem: Isolation Precautions - Risk of Spread of Infection  Goal: Prevent transmission of infectious organism to others  Outcome: Progressing Towards Goal

## 2020-12-01 NOTE — PROGRESS NOTES
Hospitalist Progress Note    NAME: Christal Varner   :  [id number]   MRN:  [id number]       Assessment / Plan:  COVID 19 pneumonia:  COPD  On 3 liters NC today, will wean off as tolerated. Emperic antibiotics for 5 days. C/w Remdesivir, Dexamethasone  C/w Vitamin C and zinc  Serial Inflammatory markers, D-Dimer and Procalcitonin  Pulmonary and ID evaluation appreciated. Scheduled Combivent  CTA chest in ED with Minimal airspace disease at the in the lower lobes and minimal groundglass opacification in the right upper lobe      Type II diabetes mellitus   Difficulty blood sugar d/t steroids, will increase NPH, c/w sliding scale     HTN (hypertension)   Continue metoprolol     GERD (gastroesophageal reflux disease)   Continue PPIs     Hyperlipidemia  Continue Statins     Morbid obesity / Body mass index is 40.74 kg/m².     Code status: Full  Prophylaxis: Lovenox  Recommended Disposition: TBD       Subjective:     Chief Complaint / Reason for Physician Visit  Follow up for COVID pna  Complains of having a lot of cough, but breathing is better. Review of Systems:  Symptom Y/N Comments  Symptom Y/N Comments   Fever/Chills n   Chest Pain y    Poor Appetite n   Edema     Cough y   Abdominal Pain y    Sputum    Joint Pain     SOB/BENTLEY y   Pruritis/Rash     Nausea/vomit    Tolerating PT/OT     Diarrhea    Tolerating Diet     Constipation    Other       Could NOT obtain due to:      Objective:     VITALS:   Last 24hrs VS reviewed since prior progress note.  Most recent are:  Patient Vitals for the past 24 hrs:   Temp Pulse Resp BP SpO2   20 1136 98.3 °F (36.8 °C) 70 16 (!) 153/80 97 %   20 0844 -- -- -- -- 94 %   20 0831 97.6 °F (36.4 °C) 70 17 (!) 147/101 95 %   20 0400 -- 77 -- -- --   20 0304 98.2 °F (36.8 °C) 73 18 (!) 145/85 100 %   20 2321 98 °F (36.7 °C) 74 20 (!) 157/89 96 %   20 2019 97.7 °F (36.5 °C) 90 18 (!) 178/91 94 %   20 1430 97.8 °F (36.6 °C) 76 17 136/68 94 %     No intake or output data in the 24 hours ending 12/01/20 1304     I had a face to face encounter and independently examined this patient on 12/1/2020, as outlined below:  PHYSICAL EXAM:  General: WD, WN. Alert, cooperative, no acute distress    EENT:  EOMI. Anicteric sclerae. MMM  Resp:  CTA bilaterally, no wheezing or rales. No accessory muscle use  CV:  Regular  rhythm,  No edema  GI:  Soft, Non distended, Non tender. +Bowel sounds  Neurologic:  Alert and oriented X 3, normal speech,   Psych:   Good insight. Not anxious nor agitated  Skin:  No rashes. No jaundice    Reviewed most current lab test results and cultures  YES  Reviewed most current radiology test results   YES  Review and summation of old records today    NO  Reviewed patient's current orders and MAR    YES  PMH/ reviewed - no change compared to H&P  ________________________________________________________________________  Care Plan discussed with:    Comments   Patient y    Family      RN y    Care Manager     Consultant                        Multidiciplinary team rounds were held today with , nursing, pharmacist and clinical coordinator. Patient's plan of care was discussed; medications were reviewed and discharge planning was addressed. ________________________________________________________________________  Total NON critical care TIME: 35   Minutes    Total CRITICAL CARE TIME Spent:   Minutes non procedure based      Comments   >50% of visit spent in counseling and coordination of care y    ________________________________________________________________________  Devin Rincon MD     Procedures: see electronic medical records for all procedures/Xrays and details which were not copied into this note but were reviewed prior to creation of Plan. LABS:  I reviewed today's most current labs and imaging studies.   Pertinent labs include:  Recent Labs     12/01/20  0429 11/30/20  0245 11/28/20  1746   WBC 9.2 8.2 7.7   HGB 13.6 13.5 14.9   HCT 43.2 42.6 47.3*    189 162     Recent Labs     12/01/20  0429 11/30/20  0245 11/29/20  0033 11/28/20  1746    138  --  135*   K 3.7 3.9  --  3.5   * 107  --  103   CO2 26 25  --  23   * 254*  --  208*   BUN 16 16  --  13   CREA 1.04* 0.89  --  1.16*   CA 8.7 8.8  --  8.9   MG  --  2.0  --   --    ALB 2.5* 2.6*  --  2.9*   TBILI 0.4 0.4  --  0.5   ALT 38 41  --  55   INR  --   --  1.1  --        Signed: Yvonne Holland MD

## 2020-12-01 NOTE — PROGRESS NOTES
Problem: Airway Clearance - Ineffective  Goal: Achieve or maintain patent airway  Outcome: Progressing Towards Goal     Problem: Gas Exchange - Impaired  Goal: Absence of hypoxia  Outcome: Progressing Towards Goal  Goal: Promote optimal lung function  Outcome: Progressing Towards Goal     Problem: Breathing Pattern - Ineffective  Goal: Ability to achieve and maintain a regular respiratory rate  Outcome: Progressing Towards Goal     Problem: Body Temperature -  Risk of, Imbalanced  Goal: Ability to maintain a body temperature within defined limits  Outcome: Progressing Towards Goal  Goal: Will regain or maintain usual level of consciousness  Outcome: Progressing Towards Goal  Goal: Complications related to the disease process, condition or treatment will be avoided or minimized  Outcome: Progressing Towards Goal     Problem: Isolation Precautions - Risk of Spread of Infection  Goal: Prevent transmission of infectious organism to others  Outcome: Progressing Towards Goal     Problem: Nutrition Deficits  Goal: Optimize nutrtional status  Outcome: Progressing Towards Goal     Problem: Risk for Fluid Volume Deficit  Goal: Maintain normal heart rhythm  Outcome: Progressing Towards Goal  Goal: Maintain absence of muscle cramping  Outcome: Progressing Towards Goal  Goal: Maintain normal serum potassium, sodium, calcium, phosphorus, and pH  Outcome: Progressing Towards Goal     Problem: Loneliness or Risk for Loneliness  Goal: Demonstrate positive use of time alone when socialization is not possible  Outcome: Progressing Towards Goal     Problem: Fatigue  Goal: Verbalize increase energy and improved vitality  Outcome: Progressing Towards Goal     Problem: Patient Education: Go to Patient Education Activity  Goal: Patient/Family Education  Outcome: Progressing Towards Goal     Problem: Falls - Risk of  Goal: *Absence of Falls  Description: Document Alida Fall Risk and appropriate interventions in the flowsheet.   Outcome: Progressing Towards Goal  Note: Fall Risk Interventions:            Medication Interventions: Patient to call before getting OOB, Teach patient to arise slowly                   Problem: Patient Education: Go to Patient Education Activity  Goal: Patient/Family Education  Outcome: Progressing Towards Goal     Problem: Breathing Pattern - Ineffective  Goal: *Absence of hypoxia  Outcome: Progressing Towards Goal  Goal: *Use of effective breathing techniques  Outcome: Progressing Towards Goal

## 2020-12-02 VITALS
BODY MASS INDEX: 40.75 KG/M2 | TEMPERATURE: 97.5 F | OXYGEN SATURATION: 94 % | DIASTOLIC BLOOD PRESSURE: 97 MMHG | WEIGHT: 230 LBS | RESPIRATION RATE: 18 BRPM | HEIGHT: 63 IN | SYSTOLIC BLOOD PRESSURE: 163 MMHG | HEART RATE: 76 BPM

## 2020-12-02 LAB
ALBUMIN SERPL-MCNC: 2.7 G/DL (ref 3.5–5)
ALBUMIN/GLOB SERPL: 0.8 {RATIO} (ref 1.1–2.2)
ALP SERPL-CCNC: 81 U/L (ref 45–117)
ALT SERPL-CCNC: 39 U/L (ref 12–78)
ANION GAP SERPL CALC-SCNC: 5 MMOL/L (ref 5–15)
AST SERPL-CCNC: 22 U/L (ref 15–37)
BILIRUB SERPL-MCNC: 0.4 MG/DL (ref 0.2–1)
BUN SERPL-MCNC: 15 MG/DL (ref 6–20)
BUN/CREAT SERPL: 17 (ref 12–20)
CALCIUM SERPL-MCNC: 9 MG/DL (ref 8.5–10.1)
CHLORIDE SERPL-SCNC: 108 MMOL/L (ref 97–108)
CO2 SERPL-SCNC: 26 MMOL/L (ref 21–32)
CREAT SERPL-MCNC: 0.86 MG/DL (ref 0.55–1.02)
CRP SERPL HS-MCNC: 6.9 MG/L
D DIMER PPP FEU-MCNC: 0.27 MG/L FEU (ref 0–0.65)
ERYTHROCYTE [DISTWIDTH] IN BLOOD BY AUTOMATED COUNT: 14.5 % (ref 11.5–14.5)
GLOBULIN SER CALC-MCNC: 3.4 G/DL (ref 2–4)
GLUCOSE BLD STRIP.AUTO-MCNC: 137 MG/DL (ref 65–100)
GLUCOSE SERPL-MCNC: 181 MG/DL (ref 65–100)
HCT VFR BLD AUTO: 42.7 % (ref 35–47)
HGB BLD-MCNC: 13.7 G/DL (ref 11.5–16)
MCH RBC QN AUTO: 26.6 PG (ref 26–34)
MCHC RBC AUTO-ENTMCNC: 32.1 G/DL (ref 30–36.5)
MCV RBC AUTO: 82.9 FL (ref 80–99)
NRBC # BLD: 0 K/UL (ref 0–0.01)
NRBC BLD-RTO: 0 PER 100 WBC
PLATELET # BLD AUTO: 248 K/UL (ref 150–400)
PMV BLD AUTO: 11.5 FL (ref 8.9–12.9)
POTASSIUM SERPL-SCNC: 3.5 MMOL/L (ref 3.5–5.1)
PROT SERPL-MCNC: 6.1 G/DL (ref 6.4–8.2)
RBC # BLD AUTO: 5.15 M/UL (ref 3.8–5.2)
SERVICE CMNT-IMP: ABNORMAL
SODIUM SERPL-SCNC: 139 MMOL/L (ref 136–145)
WBC # BLD AUTO: 8.9 K/UL (ref 3.6–11)

## 2020-12-02 PROCEDURE — 74011250637 HC RX REV CODE- 250/637: Performed by: INTERNAL MEDICINE

## 2020-12-02 PROCEDURE — 36415 COLL VENOUS BLD VENIPUNCTURE: CPT

## 2020-12-02 PROCEDURE — 85379 FIBRIN DEGRADATION QUANT: CPT

## 2020-12-02 PROCEDURE — 80053 COMPREHEN METABOLIC PANEL: CPT

## 2020-12-02 PROCEDURE — 74011250636 HC RX REV CODE- 250/636: Performed by: INTERNAL MEDICINE

## 2020-12-02 PROCEDURE — 82962 GLUCOSE BLOOD TEST: CPT

## 2020-12-02 PROCEDURE — 74011636637 HC RX REV CODE- 636/637: Performed by: STUDENT IN AN ORGANIZED HEALTH CARE EDUCATION/TRAINING PROGRAM

## 2020-12-02 PROCEDURE — 85027 COMPLETE CBC AUTOMATED: CPT

## 2020-12-02 PROCEDURE — 74011000258 HC RX REV CODE- 258: Performed by: GENERAL ACUTE CARE HOSPITAL

## 2020-12-02 PROCEDURE — 74011250636 HC RX REV CODE- 250/636: Performed by: GENERAL ACUTE CARE HOSPITAL

## 2020-12-02 PROCEDURE — 74011250637 HC RX REV CODE- 250/637: Performed by: GENERAL ACUTE CARE HOSPITAL

## 2020-12-02 PROCEDURE — 86141 C-REACTIVE PROTEIN HS: CPT

## 2020-12-02 PROCEDURE — 94640 AIRWAY INHALATION TREATMENT: CPT

## 2020-12-02 RX ADMIN — GUAIFENESIN 600 MG: 600 TABLET, EXTENDED RELEASE ORAL at 09:17

## 2020-12-02 RX ADMIN — BENZONATATE 200 MG: 100 CAPSULE ORAL at 04:07

## 2020-12-02 RX ADMIN — ACETAMINOPHEN 650 MG: 325 TABLET ORAL at 04:07

## 2020-12-02 RX ADMIN — METOPROLOL TARTRATE 25 MG: 25 TABLET, FILM COATED ORAL at 09:17

## 2020-12-02 RX ADMIN — ASPIRIN 81 MG CHEWABLE TABLET 81 MG: 81 TABLET CHEWABLE at 09:16

## 2020-12-02 RX ADMIN — Medication 10 ML: at 06:10

## 2020-12-02 RX ADMIN — OXYCODONE HYDROCHLORIDE AND ACETAMINOPHEN 1000 MG: 500 TABLET ORAL at 09:17

## 2020-12-02 RX ADMIN — ENOXAPARIN SODIUM 40 MG: 40 INJECTION SUBCUTANEOUS at 09:15

## 2020-12-02 RX ADMIN — HUMAN INSULIN 12 UNITS: 100 INJECTION, SUSPENSION SUBCUTANEOUS at 09:14

## 2020-12-02 RX ADMIN — CEFTRIAXONE 1 G: 1 INJECTION, POWDER, FOR SOLUTION INTRAMUSCULAR; INTRAVENOUS at 00:06

## 2020-12-02 RX ADMIN — BUDESONIDE AND FORMOTEROL FUMARATE DIHYDRATE 2 PUFF: 160; 4.5 AEROSOL RESPIRATORY (INHALATION) at 08:14

## 2020-12-02 RX ADMIN — ATORVASTATIN CALCIUM 40 MG: 40 TABLET, FILM COATED ORAL at 09:17

## 2020-12-02 RX ADMIN — ZINC SULFATE 220 MG (50 MG) CAPSULE 220 MG: CAPSULE at 09:17

## 2020-12-02 RX ADMIN — TIOTROPIUM BROMIDE INHALATION SPRAY 2 PUFF: 3.12 SPRAY, METERED RESPIRATORY (INHALATION) at 08:14

## 2020-12-02 RX ADMIN — PANTOPRAZOLE SODIUM 40 MG: 40 TABLET, DELAYED RELEASE ORAL at 09:17

## 2020-12-02 NOTE — DISCHARGE INSTRUCTIONS
HOSPITALIST DISCHARGE INSTRUCTIONS    NAME: Mohan Abdi   :  [id number]   MRN:  [id number]     Date/Time:  2020 9:24 AM    ADMIT DATE: 2020     DISCHARGE DATE: 2020     DISCHARGE DIAGNOSIS:  Active Problems:    Hypoxia (2020)      Pneumonia due to COVID-19 virus (2020)      Type II diabetes mellitus ([facility] [address] (2020)      HTN (hypertension) (2020)      GERD (gastroesophageal reflux disease) (2020)      Hyperlipidemia (2020)     You should be on home isolation for atleast 10 days from symptom onset, with no fever for atleast 24 hours, without antipyretics like tylenol and your symptoms have improved. MEDICATIONS:  As per medication reconciliation  list  · It is important that you take the medication exactly as they are prescribed. · Keep your medication in the bottles provided by the pharmacist and keep a list of the medication names, dosages, and times to be taken in your wallet. · Do not take other medications without consulting your doctor. Pain Management: per above medications    What to do at Home    Recommended diet:  Regular Diet    Recommended activity: Activity as tolerated    If you have questions regarding the hospital related prescriptions or hospital related issues [facility] [phone]. If you experience any of the following symptoms then please call your primary care physician or return to the emergency room if you cannot get hold of your doctor:  Fever, chills, nausea, vomiting, diarrhea, change in mentation, falling, bleeding, shortness of breath,     Follow Up:  Dr. Wendy Ibrahim MD  you are to call and set up an appointment to see them in 7-10 days. Information obtained by :  I understand that if any problems occur once I am at home I am to contact my physician. I understand and acknowledge receipt of the instructions indicated above. Physician's or R.N.'s Signature                                                                  Date/Time                                                                                                                                              Patient or Representative Signature                                                          Date/Time

## 2020-12-02 NOTE — PROGRESS NOTES
Transition of Care  · Home   · PCP follow-up  · May need to follow-up with pulm if cannot follow-up with PCP        CM notified of Pt discharging today. CM reviewed chart regarding O2 needs. CM spoke with attending and was informed that Pt has been on room air since yesterday. CM contacted nursing to confirm. Medicare pt has received 2nd IM letter informing them of their right to appeal the discharge. Pt unable to sign due to COVID restrictions. Pt provided verbal consent. Copy has been placed on pt bedside chart. At this time there are no other discharge needs at this time.        Billy Aguilar, [address] [place] [address]

## 2020-12-02 NOTE — ROUTINE PROCESS
Attempted to schedule PCP JEFF apt with Dr. Diogo Ledbetter, informed that nurse would have to contact patient in order to schedule virtual follow up appointment

## 2020-12-02 NOTE — PROGRESS NOTES
Problem: Airway Clearance - Ineffective  Goal: Achieve or maintain patent airway  Outcome: Progressing Towards Goal     Problem: Gas Exchange - Impaired  Goal: Absence of hypoxia  Outcome: Progressing Towards Goal  Goal: Promote optimal lung function  Outcome: Progressing Towards Goal     Problem: Breathing Pattern - Ineffective  Goal: Ability to achieve and maintain a regular respiratory rate  Outcome: Progressing Towards Goal     Problem: Body Temperature -  Risk of, Imbalanced  Goal: Ability to maintain a body temperature within defined limits  Outcome: Progressing Towards Goal  Goal: Will regain or maintain usual level of consciousness  Outcome: Progressing Towards Goal  Goal: Complications related to the disease process, condition or treatment will be avoided or minimized  Outcome: Progressing Towards Goal     Problem: Nutrition Deficits  Goal: Optimize nutrtional status  Outcome: Progressing Towards Goal     Problem: Risk for Fluid Volume Deficit  Goal: Maintain normal heart rhythm  Outcome: Progressing Towards Goal  Goal: Maintain absence of muscle cramping  Outcome: Progressing Towards Goal  Goal: Maintain normal serum potassium, sodium, calcium, phosphorus, and pH  Outcome: Progressing Towards Goal     Problem: Loneliness or Risk for Loneliness  Goal: Demonstrate positive use of time alone when socialization is not possible  Outcome: Progressing Towards Goal     Problem: Fatigue  Goal: Verbalize increase energy and improved vitality  Outcome: Progressing Towards Goal     Problem: Patient Education: Go to Patient Education Activity  Goal: Patient/Family Education  Outcome: Progressing Towards Goal     Problem: Falls - Risk of  Goal: *Absence of Falls  Description: Document Alida Fall Risk and appropriate interventions in the flowsheet.   Outcome: Progressing Towards Goal  Note: Fall Risk Interventions:            Medication Interventions: Evaluate medications/consider consulting pharmacy, Patient to call before getting OOB, Teach patient to arise slowly

## 2020-12-02 NOTE — PROGRESS NOTES
End of Shift Note    Bedside shift change report given to Reinaldo Giang RN (oncoming nurse) by Esvin Tanner RN (offgoing nurse). Report included the following information SBAR, Kardex, ED Summary, Intake/Output, MAR, Accordion and Recent Results    Shift worked:  7p-7a     Shift summary and any significant changes:     Slept all night     Concerns for physician to address:  None     Zone phone for oncoming shift:   [phone]       Activity:  Activity Level: Up ad abida  Number times ambulated in hallways past shift: 2  Number of times OOB to chair past shift: 2    Cardiac:   Cardiac Monitoring: Yes      Cardiac Rhythm: Sinus bradycardia    Access:   Current line(s): PIV     Genitourinary:   Urinary status: voiding    Respiratory:   O2 Device: Room air  Chronic home O2 use?: YES  Incentive spirometer at bedside: YES     GI:  Last Bowel Movement Date: 12/01/20  Current diet:  DIET DIABETIC CONSISTENT CARB Regular  Passing flatus: YES  Tolerating current diet: YES       Pain Management:   Patient states pain is manageable on current regimen: YES    Skin:  Jose Score: 21  Interventions: increase time out of bed    Patient Safety:  Fall Score:  Total Score: 1  Interventions: pt to call before getting OOB       Length of Stay:  Expected LOS: 5d 12h  Actual LOS: 611 Jaison Nascimento RN

## 2020-12-02 NOTE — PROGRESS NOTES
Patient given discharge instructions and given the opportunity to ask questions regarding the discharge instructions. Patient IV removed with cath tip intact.  Patient discharged with follow up appts and all personal belongings to quarantine for 10 days

## 2020-12-02 NOTE — PROGRESS NOTES
[facility] Consult Service Progress NOTE  Pulmonary, Critical Care, and Sleep Medicine    Name: Diya Coe MRN: [id number]   : 1955 Hospital: [facility]   Date: 2020  Admission Date: 2020     Hospital Day: 5    Chart and notes reviewed. Data reviewed. I have evaluated and examined the patient. I have reviewed the notes from other providers and old records readily available and summarized findings below with the flowsheet. Pt is unstable and acutely ill. 2020 OOB in chair. No fever. Cough is what brought her in. chronically takes prednisone 5 mg a day, though recently increased this prior to admission to 10 mg a day. Poor sleep last night but after first infusion, reports better? No anosmia. Mild HA. No diarrhea. Many  Of her co workers now testing positive. Mask violuntary at work but everyone has temp tested and areas are isolated somewhat. Family not ill. 2020: alpha strep in blood . Now OOB in chair. Still needs O2. Has CPAP at home   2020: alpha strep in blood . On abx. No fever. Since last night off O2. Mild BENTLEY. Now OOB in chair. Has CPAP at home. PCP on medical leave    IMPRESSION:   1. Acute on chronic hypoxic respiratory failure 96% on 2 L oxygen; on home O2 per PCP  2. Covid pneumonia CTA-airspace disease lower lobes, GGO right upper lobe  3. Alpha strep bacteremia- source? 4. Covid rapid positive on  Elevated acute phase reactants-, CRP 5.45, ferritin 368,procalcitonin < 0.05  5. COPD, Symbicort and Spiriva - not acutely exacerbated  6. Chronic steroids Prednisone 5 mg daily for Allergic asthma  7. CHELSEA on CPAP at home and compliant  8. Past history of smoking quit smoking in    9. Atherosclerotic heart disease? ,   10. H/o breast cancer with right mastectomy ,   11. GERD   12. Diabetes type 2- uncontrolled and may worsenon steroids  13. Chronic Hypertension  14.  Morbid obesity Body mass index is 40.74 kg/m². RECOMMENDATIONS/PLAN:   1. Remdesivir per ID recs  2. Dexamethasone- to finish 10 day course then resume home dose of prednisone  3. Antibiotics- per culture; source? To finish a course  4. No need to consider convalescent plasma-  5. Pt may follow up me in office until PCP available  6. Glad she is better, thanks  7. Prescription drug management with home med reconciliation done        Patient PCP: Jefferson Ocasio MD  Social History     Tobacco Use    Smoking status: Former Smoker     Last attempt to quit: 2011     Years since quittin.9   Substance Use Topics    Alcohol use: Yes     Comment: 2 beers per night      History reviewed. No pertinent family history.      Allergies   Allergen Reactions    Codeine Nausea and Vomiting and Vertigo        MAR reviewed and pertinent medications noted or modified as needed  Current Facility-Administered Medications   Medication    insulin NPH (NOVOLIN N, HUMULIN N) injection 12 Units    alum-mag hydroxide-simeth (MYLANTA) oral suspension 30 mL    influenza vaccine 2020-21 (6 mos+)(PF) (FLUARIX/FLULAVAL/FLUZONE QUAD) injection 0.5 mL    azithromycin (ZITHROMAX) 500 mg in 0.9% sodium chloride 250 mL (VIAL-MATE)    cefTRIAXone (ROCEPHIN) 1 g in 0.9% sodium chloride (MBP/ADV) 50 mL MBP    sodium chloride (NS) flush 5-40 mL    sodium chloride (NS) flush 5-40 mL    acetaminophen (TYLENOL) tablet 650 mg    Or    acetaminophen (TYLENOL) suppository 650 mg    polyethylene glycol (MIRALAX) packet 17 g    promethazine (PHENERGAN) tablet 12.5 mg    Or    ondansetron (ZOFRAN) injection 4 mg    ascorbic acid (vitamin C) (VITAMIN C) tablet 1,000 mg    zinc sulfate (ZINCATE) 220 (50) mg capsule 220 mg    acetaminophen (TYLENOL) suppository 650 mg    enoxaparin (LOVENOX) injection 40 mg    insulin lispro (HUMALOG) injection    glucose chewable tablet 16 g    dextrose (D50W) injection syrg 12.5-25 g    glucagon (GLUCAGEN) injection 1 mg    aspirin chewable tablet 81 mg    atorvastatin (LIPITOR) tablet 40 mg    metoprolol tartrate (LOPRESSOR) tablet 25 mg    pantoprazole (PROTONIX) tablet 40 mg    tiotropium bromide (SPIRIVA RESPIMAT) 2.5 mcg /actuation    dexamethasone (DECADRON) 4 mg/mL injection 6 mg    guaiFENesin ER (MUCINEX) tablet 600 mg    benzonatate (TESSALON) capsule 200 mg    ipratropium-albuterol (COMBIVENT RESPIMAT) 20 mcg-100 mcg inhalation spray    budesonide-formoteroL (SYMBICORT) 160-4.5 mcg/actuation HFA inhaler 2 Puff    remdesivir 100 mg in 0.9% sodium chloride 250 mL IVPB    oxyCODONE-acetaminophen (PERCOCET) 5-325 mg per tablet 1 Tab    fentaNYL citrate (PF) injection 25 mcg    inhalational spacing device        Vital Signs: Intake/Output: Intake/Output:   Visit Vitals  BP (!) 163/97   Pulse 76   Temp 97.5 °F (36.4 °C)   Resp 18   Ht 5' 3\" (1.6 m)   Wt 104.3 kg (230 lb)   SpO2 94%   BMI 40.74 kg/m²    Temp (24hrs), Av.9 °F (36.6 °C), Min:97.5 °F (36.4 °C), Max:98.3 °F (36.8 °C)        Telemetry: O2 Device: Room air  O2 Flow Rate (L/min): 3 l/min    Wt Readings from Last 4 Encounters:   20 104.3 kg (230 lb)   17 105.2 kg (232 lb)        No intake or output data in the 24 hours ending 20 0914    Last shift:      No intake/output data recorded. Last 3 shifts: No intake/output data recorded. Physical Exam:    General: alert, oriented times 3 and moderately ill;   female; 2NC O2,    HEAD: Normocephalic, without obvious abnormality, atraumatic, syndromic facies   EYES: conjunctivae clear. PERRL, AN Icteric sclerae   NOSE: nares normal, no drainage, no flaring,    THROAT: mucous membranes moist; Lips, mucosa dry; No Thrush; class 4 airway; Neck: Supple, symmetrical, trachea midline,  No accessory mm use; No goiter or thyroid tenderness   LYMPH: No abnormally enlarged lymph nodes.  in cervical or supraclavicular regions   Chest: increased AP diameter   Lungs: no wheeze   Heart: Regular rate and rhythm ; NO edema   Abdomen: soft, non-tender, without masses or organomegaly, OBESE   : no  Geiger,     Extremity: negative, cyanosis, clubbing   Neuro: alert; speech fluent; unable to check gait and station; does follow simple commands   Psych: oriented to time, place and person; No agitation; normal affect   Skin: Skin unremarkable;    Pulses:Bilateral   Capillary refill: normal; well perfused,        Recent Labs     12/02/20 0355 12/01/20 0429 11/30/20  0245   WBC 8.9 9.2 8.2   HGB 13.7 13.6 13.5    235 189   APTT  --  23.6 25.9     Recent Labs     12/02/20 0355 12/01/20 0429 11/30/20  0245    140 138   K 3.5 3.7 3.9    109* 107   CO2 26 26 25   * 197* 254*   BUN 15 16 16   CREA 0.86 1.04* 0.89   CA 9.0 8.7 8.8   MG  --   --  2.0   ALB 2.7* 2.5* 2.6*   ALT 39 38 41     No results for input(s): PH, PCO2, PO2, HCO3, FIO2 in the last 72 hours. No results for input(s): CPK, CKNDX, TROIQ in the last 72 hours. No lab exists for component: CPKMB  No results found for: BNPP, BNP   Lab Results   Component Value Date/Time    Culture result: (A) 11/29/2020 12:26 AM     ALPHA STREPTOCOCCUS, NOT S. PNEUMONIAE GROWING IN 1 OF 4 BOTTLES DRAWN (SITE = [facility] 0026)    Culture result: REMAINING BOTTLE(S) HAS/HAVE NO GROWTH SO FAR 11/29/2020 12:26 AM     No results found for: TSH, TSHEXT, TSHEXT    Imaging:     CXR Results  (Last 48 hours)    None         Results from Hospital Encounter encounter on 11/28/20   XR CHEST PORT    Narrative INDICATION:  SOB     EXAM: Single portable view of chest 1846 . Comparison:5/23/2017    Findings: Cardiac silhouette is enlarged. Pulmonary vasculature is not engorged. There are no focal parenchymal opacities, effusions, or pneumothorax. Impression Impression:  1.  Enlarged cardiac silhouette, otherwise no acute disease       Results from Hospital Encounter encounter on 11/28/20   XR CHEST PORT    Narrative INDICATION:  SOB     EXAM: Single portable view of chest 1846 . Comparison:5/23/2017    Findings: Cardiac silhouette is enlarged. Pulmonary vasculature is not engorged. There are no focal parenchymal opacities, effusions, or pneumothorax. Impression Impression:  1. Enlarged cardiac silhouette, otherwise no acute disease     Results from Hospital Encounter encounter on 05/23/17   XR CHEST PORT    Narrative INDICATION: Lower mid back and chest pain with tremors    COMPARISON: None    A single frontal view was obtained. The time of this study is 2058 hours. Lungs  are clear. Heart size is at the upper limits of normal for this type of  projection. Mediastinal shadows are normal..           Impression IMPRESSION:  No acute finding     Results from Hospital Encounter encounter on 11/28/20   CTA CHEST W OR W WO CONT    Narrative History: Hypoxia. CTA of the chest was performed. 100 mL Isovue-370 were injected and scanning  was performed during the arterial phase of contrast administration. Post  processing was performed. 3D reconstructions were performed. CT dose reduction  was achieved through use of a standardized protocol tailored for this  examination and automatic exposure control for dose modulation. There are no intraluminal filling defects to suggest pulmonary embolism. The  heart, pericardium, and great vessels appear unremarkable. The chest wall and  axilla appear unremarkable. There is mild opacification in the lower lobes  posteriorly and there is some minimal peripheral groundglass opacification in  the upper lobes. Impression IMPRESSION:  1. No evidence for pulmonary embolism. 2. Minimal airspace disease at the in the lower lobes and minimal groundglass  opacification in the right upper lobe.             My assessment/management discussed with: Consultants, Nursing, Respiratory Therapy, Hospitalist and Family for coordination of care    Medical Decision Making Today:  · Reviewed the flowsheet and previous days notes  · Reviewed and summarized records or history from previous days note or discussions with staff, family  · Parenteral controlled substances - Reviewed/ Adjusted / Link [place] / Started  · High Risk Drug therapy requiring intensive monitoring for toxicity: eg steroids, pressors, antibiotics  · Reviewed and/or ordered Clinical lab tests  · Reviewed and/or ordered Radiology tests  · Reviewed and/or ordered of Medicine tests  · Independently visualized radiologic Images  · I have personally reviewed the patients ECG / Telemetry  · Reviewed and/or adjusted Ventilator / NiPPV settings      Bel Holm MD

## 2020-12-02 NOTE — DISCHARGE SUMMARY
Hospitalist Discharge Summary     Patient ID:  Davis Atkinson  [id number]  60 y.o.  1955 11/28/2020    PCP on record: Gavino Lantigua MD    Admit date: 11/28/2020  Discharge date and time: 12/2/2020    DISCHARGE DIAGNOSIS:    COVID 19 pneumonia    CONSULTATIONS:  IP CONSULT TO HOSPITALIST  IP CONSULT TO INFECTIOUS DISEASES  IP CONSULT TO PULMONOLOGY    Excerpted HPI from H&P of Gabriela Aleman MD:  Jamel Costa is a 76 y.o.  female who presents with cough and shortness of breath. Pt reported she started to have cough and progressively worsening shortness of breath for past 6 days. She denies any fever, chills, nausea, vomiting, diarrhea, chest pain, problems urination. In ED pt noted to be hypoxic 88% per ED and Bedside RN verbal report. CTA chest showed Minimal airspace disease at the in the lower lobes and minimal groundglass opacification in the ine Alessia Azul is a 76 y.o.  female who presents with cough and shortness of breath. Pt reported she started to have cough and progressively worsening shortness of breath for past 6 days. She denies any fever, chills, nausea, vomiting, diarrhea, chest pain, problems urination. In ED pt noted to be hypoxic 88% per ED and Bedside RN verbal report. CTA chest showed Minimal airspace disease at the in the lower lobes and minimal groundglass opacification     ______________________________________________________________________  DISCHARGE SUMMARY/HOSPITAL COURSE:  for full details see H&P, daily progress notes, labs, consult notes. COVID 19 pneumonia:  COPD  On room air since yesterday  Emeric antibiotics stopped on discharge.   Received 3 doses of Remdesiver, she has improved, off oxygen since yesterday, so no need for further dosing., Dexamethasone stopped on discharge, can go back to her home dose of prednisone  Being discharged to home with isolation.     Type II diabetes mellitus   Can resume home meds.     HTN (hypertension)   Continue metoprolol     GERD (gastroesophageal reflux disease)   Continue PPIs     Hyperlipidemia  Continue Statins     Morbid obesity / Body mass index is 40.74 kg/m².       _______________________________________________________________________  Patient seen and examined by me on discharge day. Pertinent Findings:  Gen:    Not in distress  Chest: Clear lungs  CVS:   Regular rhythm. No edema  Abd:  Soft, not distended, not tender  Neuro:  Alert,   _______________________________________________________________________  DISCHARGE MEDICATIONS:   Current Discharge Medication List      CONTINUE these medications which have NOT CHANGED    Details   HYDROcodone-acetaminophen (NORCO) 7.5-325 mg per tablet Take 1 Tab by mouth every six (6) hours as needed for Pain. Max Daily Amount: 4 Tabs. Qty: 20 Tab, Refills: 0      metFORMIN (GLUCOPHAGE) 1,000 mg tablet Take 1,000 mg by mouth two (2) times a day. omeprazole (PRILOSEC) 20 mg capsule Take 20 mg by mouth daily. metoprolol tartrate (LOPRESSOR) 25 mg tablet Take 25 mg by mouth two (2) times a day. aspirin 81 mg chewable tablet Take 81 mg by mouth daily. predniSONE (DELTASONE) 10 mg tablet Take 10 mg by mouth daily. atorvastatin (LIPITOR) 40 mg tablet Take 40 mg by mouth daily. albuterol (PROAIR HFA) 90 mcg/actuation inhaler Take 1 Puff by inhalation every six (6) hours as needed for Wheezing. tiotropium bromide (SPIRIVA RESPIMAT) 2.5 mcg/actuation inhaler Take 2 Puffs by inhalation daily. albuterol-ipratropium (DUONEB) 2.5 mg-0.5 mg/3 ml nebu 3 mL by Nebulization route every six (6) hours as needed. budesonide-formoterol (SYMBICORT) 160-4.5 mcg/actuation HFA inhaler Take 2 Puffs by inhalation two (2) times a day. Patient Follow Up Instructions:    Activity: Activity as tolerated  Diet: Diabetic Diet  Wound Care: None needed    Follow-up with PMD in 1-2 weeks    Follow-up Information     Follow up With Specialties Details Why Contact Info    Damien Hodgkins, MD [facility]   [address]  [phone]          ________________________________________________________________    Risk of deterioration: Moderate    Condition at Discharge:  Stable  __________________________________________________________________    Disposition  Home with family, no needs    ____________________________________________________________________    Code Status: Full Code  ___________________________________________________________________      Total time in minutes spent coordinating this discharge (includes going over instructions, follow-up, prescriptions, and preparing report for sign off to her PCP) :  >30 minutes    Signed:  Isaac Mcneil MD .

## 2020-12-03 ENCOUNTER — PATIENT OUTREACH (OUTPATIENT)
Dept: CASE MANAGEMENT | Age: 65
End: 2020-12-03

## 2020-12-03 NOTE — PROGRESS NOTES
Patient contacted regarding COVID-19 diagnosis. Discussed COVID-19 related testing which was available at this time. Test results were positive. Patient informed of results, if available? No, pt had positive COVID-19 test on 20, during this hospitalization. Outreach made within 2 business days of discharge: Yes    Care Transition Nurse/ Ambulatory Care Manager/ LPN Care Coordinator contacted the patient by telephone to perform post discharge assessment. Verified name and  with patient as identifiers. Provided introduction to self, and explanation of the CTN/ACM/LPN role, and reason for call due to risk factors for infection and/or exposure to COVID-19. Symptoms reviewed with patient who verbalized the following symptoms: no new symptoms and no worsening symptoms. Due to no new or worsening symptoms encounter was not routed to provider for escalation. Discussed follow-up appointments. If no appointment was previously scheduled, appointment scheduling offered: yes  [address] follow up appointment(s): No future appointments. Non-[facility] follow up appointment(s): pt states feeling better today, but still having some general weakness. Patient states she will follow up with her primary care as needed. Advance Care Planning:   Does patient have an Advance Directive: currently not on file; patient declined education    Patient has following risk factors of: diabetes and hypertension. CTN/ACM/LPN reviewed discharge instructions, medical action plan and red flags such as increased shortness of breath, increasing fever and signs of decompensation with patient who verbalized understanding. Discussed exposure protocols and quarantine with CDC Guidelines What to do if you are sick with coronavirus disease .  Patient was given an opportunity for questions and concerns.  The patient agrees to contact the [facility] exposure line [phone], [facility] department [address]  ([phone]) and PCP office for questions related to their healthcare. CTN/ACM provided contact information for future needs. Reviewed and educated patient on any new and changed medications related to discharge diagnosis. Patient/family/caregiver given information for [employer] and agrees to enroll no  Patient's preferred e-mail:  n/a  Patient's preferred phone number: n/a  Based on Loop alert triggers, patient will be contacted by nurse care manager for worsening symptoms. Plan for follow-up call in 5-7 days based on severity of symptoms and risk factors.

## 2020-12-04 LAB
BACTERIA SPEC CULT: ABNORMAL
BACTERIA SPEC CULT: ABNORMAL
SERVICE CMNT-IMP: ABNORMAL

## 2020-12-07 NOTE — ED PROVIDER NOTES
EMERGENCY DEPARTMENT HISTORY AND PHYSICAL EXAM      Date: 11/28/2020  Patient Name: Kymberly Ochoa    History of Presenting Illness     Chief Complaint   Patient presents with    Shortness of Breath     Into triage via wheelchair with c/o SOB, cough, CP x several days - Awaiting Covid swab results (done yesterday)    Cough    Chest Pain       History Provided By: Patient    HPI: Kymberly Ochoa, 76 y.o. female presents to the ED with cc of shortness of breath. Pt presents to the ED by POV for SOA. Symptoms began a few days ago and has been progressively worsening. Her SOA is moderate in in tensity at rest, but worsens with minimal exertion. Her exertional SOA time to recover has been lengthening. There has been dry cough primarily. She has noted wheezing at time. She denies fever or chills. Her chest hurts rated 5/10 worsened by cough. There has been no abd pain, n/v/d. There has been no swelling or pain in the lower extremities. She was tested for COVID but she does not know the results of that test.     There are no other complaints, changes, or physical findings at this time. PCP: Petty Smith MD    No current facility-administered medications on file prior to encounter. Current Outpatient Medications on File Prior to Encounter   Medication Sig Dispense Refill    HYDROcodone-acetaminophen (NORCO) 7.5-325 mg per tablet Take 1 Tab by mouth every six (6) hours as needed for Pain. Max Daily Amount: 4 Tabs. 20 Tab 0    metFORMIN (GLUCOPHAGE) 1,000 mg tablet Take 1,000 mg by mouth two (2) times a day.  omeprazole (PRILOSEC) 20 mg capsule Take 20 mg by mouth daily.  metoprolol tartrate (LOPRESSOR) 25 mg tablet Take 25 mg by mouth two (2) times a day.  aspirin 81 mg chewable tablet Take 81 mg by mouth daily.  predniSONE (DELTASONE) 10 mg tablet Take 10 mg by mouth daily.  atorvastatin (LIPITOR) 40 mg tablet Take 40 mg by mouth daily.       albuterol (PROAIR HFA) 90 mcg/actuation inhaler Take 1 Puff by inhalation every six (6) hours as needed for Wheezing.  tiotropium bromide (SPIRIVA RESPIMAT) 2.5 mcg/actuation inhaler Take 2 Puffs by inhalation daily.  albuterol-ipratropium (DUONEB) 2.5 mg-0.5 mg/3 ml nebu 3 mL by Nebulization route every six (6) hours as needed.  budesonide-formoterol (SYMBICORT) 160-4.5 mcg/actuation HFA inhaler Take 2 Puffs by inhalation two (2) times a day. Past History     Past Medical History:  Past Medical History:   Diagnosis Date    CAD (coronary artery disease)     Cancer [facility] [address]     breast cancer with right radical mastectomy     Chronic obstructive pulmonary disease ([facility] [address] [facility]     Diabetes ([facility] [address]     Gastrointestinal disorder     heart burn    Sleep disorder     uses CPAP at night       Past Surgical History:  Past Surgical History:   Procedure Laterality Date    BREAST SURGERY PROCEDURE UNLISTED      right radical mastectomy    CARDIAC SURG PROCEDURE UNLIST      1 cardiac stent        Family History:  History reviewed. No pertinent family history. Social History:  Social History     Tobacco Use    Smoking status: Former Smoker     Last attempt to quit: 2011     Years since quittin.9   Substance Use Topics    Alcohol use: Yes     Comment: 2 beers per night    Drug use: No       Allergies: Allergies   Allergen Reactions    Codeine Nausea and Vomiting and Vertigo         Review of Systems   Review of Systems    Physical Exam   Physical Exam  Vitals signs and nursing note reviewed. Constitutional:       General: She is in acute distress. Appearance: She is well-developed. She is obese. She is not diaphoretic. HENT:      Head: Normocephalic and atraumatic. Mouth/Throat:      Pharynx: No oropharyngeal exudate. Eyes:      Extraocular Movements: Extraocular movements intact. Conjunctiva/sclera: Conjunctivae normal.      Pupils: Pupils are equal, round, and reactive to light.    Neck: Musculoskeletal: Normal range of motion and neck supple. Vascular: No JVD. Trachea: No tracheal deviation. Cardiovascular:      Rate and Rhythm: Normal rate and regular rhythm. Heart sounds: Normal heart sounds. No murmur. Pulmonary:      Effort: Accessory muscle usage and respiratory distress present. Breath sounds: No stridor. Examination of the right-lower field reveals decreased breath sounds. Examination of the left-lower field reveals decreased breath sounds. Decreased breath sounds, wheezing and rhonchi present. No rales. Comments: Increase work of breathing with wheezing. Abdominal:      General: There is no distension. Palpations: Abdomen is soft. Tenderness: There is no abdominal tenderness. There is no guarding or rebound. Musculoskeletal: Normal range of motion. General: No tenderness. Right lower leg: She exhibits no tenderness. No edema. Left lower leg: She exhibits no tenderness. No edema. Skin:     General: Skin is warm and dry. Capillary Refill: Capillary refill takes less than 2 seconds. Neurological:      Mental Status: She is alert and oriented to person, place, and time. Cranial Nerves: No cranial nerve deficit. Comments: No gross motor or sensory deficits    Psychiatric:         Behavior: Behavior normal.         Diagnostic Study Results     Labs -  FERRITIN (Final result)    Component (Lab Inquiry)   Collection Time  Result Time  FERR    11/29/20 04:31:00  11/29/20 10:17:50  360High             Final result                             Contains critical data  SARS-COV-2 (Final result)    Component (Lab Inquiry)   Collection Time  Result Time  FCOV2M  COVRS  COVR  XMCV1T  XMCV2T    11/29/20 01:25:00  11/29/20 01:53:38  Nasopharyngeal  Nasopharyngeal  Detected   Rapid Bravo ID Now   . Westley Baker Panic     NP Swab  Symptomatic Testing         Final result                           PROCALCITONIN (Final result)    Component (Lab Inquiry)   Collection Time  Result Time  PROCAT    11/29/20 00:33:00  11/29/20 01:51:37  <0.05     Suspected Sepsis:. ..           Final result                             PTT (Final result)    Component (Lab Inquiry)   Collection Time  Result Time  PTTP  PTTT    11/29/20 00:33:00  11/29/20 01:07:12  25.2   In addition to factor . ..            Final result                             PROTHROMBIN TIME + INR (Final result)    Component (Lab Inquiry)   Collection Time  Result Time  INR  PTP    11/29/20 00:33:00  11/29/20 01:07:12  1.1   A single therapeutic r...  11.0           Final result                             Contains abnormal data  LD (Final result)    Component (Lab Inquiry)   Collection Time  Result Time  LDH    11/29/20 00:33:00  11/29/20 01:18:47  329High           Final result                           Contains abnormal data  FIBRINOGEN (Final result)    Component (Lab Inquiry)   Collection Time  Result Time  [facility]    11/29/20 00:33:00  11/29/20 01:07:12  572   VERIFIED BY DILUTIONHigh           Final result                           SAMPLES BEING HELD (Final result)    Component (Lab Inquiry)   Collection Time  Result Time  HOLD  HOLDF    11/29/20 00:30:00  11/29/20 00:45:12  1LAV  Add-on orders for these samples will be processed based on acceptable specimen integrity and analyte stability, which may vary by analyte.           Final result                             Contains abnormal data  FERRITIN (Final result)    Component (Lab Inquiry)   Collection Time  Result Time  FERR    11/29/20 00:30:00  11/29/20 10:17:50  368High           Final result                           Contains abnormal data  C REACTIVE PROTEIN, QT (Final result)    Component (Lab Inquiry)   Collection Time  Result Time  CRP    11/29/20 00:30:00  11/29/20 10:17:50  5.45   CRP is a nonspecific a. Derril Fazal Derril Fazal High           Final result                           Contains abnormal data  CULTURE, BLOOD, PAIRED (Final result)    Component (Lab Inquiry)   Collection Time  Result Time  SREQ  CULT  CULT    11/29/20 00:26:00  12/04/20 07:40:06  NO SPECIAL REQUESTS  ALPHA STREPTOCOCCUS, NOT S. PNEUMONIAE GROWING IN 1 OF 4 BOTTLES DRAWN (SITE = LAC 0026)Abnormal    REMAINING BOTTLE(S) HAS/HAVE NO GROWTH IN 5 DAYS         Final result                           MICRO TRACKING (In process)   Result time 11/30/20 09:07:31        Contains abnormal data  CBC WITH AUTOMATED DIFF (Final result)    Component (Lab Inquiry)   Collection Time  Result Time  WBC  RBC  HGB  HCT  MCV    11/28/20 17:46:00  11/28/20 18:01:57  7.7  5. 63High    14.9  47.3High    84.0         Collection Time  Result Time  MCH  MCHC  RDW  PLT  MPLV    11/28/20 17:46:00  11/28/20 18:01:57  26.5  31.5  14.8High    162  11.8         Collection Time  Result Time  NRBC  ANRBC  GRANS  LYMPH  MONOS    11/28/20 17:46:00  11/28/20 18:01:57  0.0  0.00  74  17  9         Collection Time  Result Time  EOS  BASOS  IG  ABG  ABL    11/28/20 17:46:00  11/28/20 18:01:57  0  0  0  5.6  1.3         Collection Time  Result Time  ABM  DAVE  ABB  AIG  DF    11/28/20 17:46:00  11/28/20 18:01:57  0.7  0.0  0.0  0.0  AUTOMATED         Final result                           Contains abnormal data  METABOLIC PANEL, COMPREHENSIVE (Final result)    Component (Lab Inquiry)   Collection Time  Result Time  NA  K  CL  CO2  AGAP    11/28/20 17:46:00  11/28/20 18:25:54  135Low    3.5  103  23  9         Collection Time  Result Time  GLU  BUN  CREA  BUCR  GFRAA    11/28/20 17:46:00  11/28/20 18:25:54  208High    13  1. 16High    11Low    57Low           Collection Time  Result Time  GFRNA  CA  TBIL  GPT  SGOT    11/28/20 17:46:00  11/28/20 18:25:54  47   Estimated GFR is calcu. [employer]ia [employer] Low    8.9  0.5  55  49High           Collection Time  Result Time  AP  TP  ALB  GLOB  AGRAT    11/28/20 17:46:00  11/28/20 18:25:54  102  7.0  2.9Low    4.1High    0.7Low           Final result                           TROPONIN I (Final result)    Component (Lab Inquiry)   Collection Time  Result Time  TROIQ    11/28/20 17:46:00  11/28/20 18:25:54  <0.05   The presence of detect. ..           Final result                          Radiologic Studies -   CTA CHEST W OR W WO CONT   Final Result   IMPRESSION:   1. No evidence for pulmonary embolism. 2. Minimal airspace disease at the in the lower lobes and minimal groundglass   opacification in the right upper lobe. XR CHEST PORT   Final Result   Impression:   1. Enlarged cardiac silhouette, otherwise no acute disease           CT Results  (Last 48 hours)    None        CXR Results  (Last 48 hours)    None          Medical Decision Making   I am the first provider for this patient. I reviewed the vital signs, available nursing notes, past medical history, past surgical history, family history and social history. Vital Signs-Reviewed the patient's vital signs. EKG interpretation: (Preliminary)  Sinus, PACs, rate 93, normal axis/pr/qrs, no acute ST changes. Records Reviewed: Nursing Notes, Old Medical Records, Previous Radiology Studies and Previous Laboratory Studies    Provider Notes (Medical Decision Making):   DDx- COPD exacerbation, Pneumonia, bronchitis, COVID, pleural effusion, pulmonary edema    ED Course:   Initial assessment performed. The patients presenting problems have been discussed, and they are in agreement with the care plan formulated and outlined with them. I have encouraged them to ask questions as they arise throughout their visit. At initial presentation ot wheezing and appeared short of breath. Following albuterol pt had felt better and plan was for discharge and pt given oral dose of doxycycline. After administration of AB. Pt appeared short of breath and minimal exertion resulted to increase resp rate, use of accessory muscles and oxygen sats did drop into th upper 80:s.  There is high suspicion of COVID and rapid ordered and pt will need to be admitted given severity of symptoms and likelihood of COVID, underlying pulmonary disease with high risk of clinical deterioration. IV Ab ordered and decadron. Pt placed on O2 for comfort. I have updated the pts daughter with my concerns and she is understanding of tx plan. Consult Note-   Case discussed with Dr. Tarsha Bower, he will evaluate and admit. Critical Care Time:   CRITICAL CARE NOTE :    IMPENDING DETERIORATION -Respiratory  ASSOCIATED RISK FACTORS - Hypoxia  MANAGEMENT- Bedside Assessment and Supervision of Care  INTERPRETATION -  Xrays, CT Scan, ECG, Blood Pressure, Cardiac Output Measures  and labs  INTERVENTIONS - hemodynamic mngmt and oxygen, IV Ab  CASE REVIEW - Hospitalist and Nursing, Family  TREATMENT RESPONSE -Stable  PERFORMED BY - Self    NOTES   :  I have spent 40 minutes of critical care time involved in lab review, consultations with specialist, family decision- making, bedside attention and documentation. This time excludes time spent in any separate billed procedures. During this entire length of time I was immediately available to the patient . Karen Downing DO      Disposition:  Admit      Diagnosis     Clinical Impression:   1. Acute respiratory failure due to COVID-19 ([facility])    2. Hypoxia    3. Bilateral pulmonary infiltrates on chest x-ray    4. H/O carcinoma in situ of breast    5. Lab test positive for detection of COVID-19 virus    6. CHELSEA (obstructive sleep apnea)    7. Essential hypertension    8. Pneumonia due to COVID-19 virus        Attestations:    Karen Downing DO    Please note that this dictation was completed with Ryzing, the computer voice recognition software. Quite often unanticipated grammatical, syntax, homophones, and other interpretive errors are inadvertently transcribed by the computer software. Please disregard these errors. Please excuse any errors that have escaped final proofreading. Thank you.

## 2020-12-10 ENCOUNTER — PATIENT OUTREACH (OUTPATIENT)
Dept: CASE MANAGEMENT | Age: 65
End: 2020-12-10

## 2020-12-10 NOTE — PROGRESS NOTES
Patient contacted regarding COVID-19 risk and screening. Discussed COVID-19 related testing which was available at this time. Test results were positive. Patient informed of results, if available? Yes, pt tested positive for COVID-19 on 20 during this hospitalization. Care Transition Nurse/ Ambulatory Care Manager/ LPN Care Coordinator contacted the family by telephone to perform follow-up assessment. Verified name and  with family as identifiers. Patient has following risk factors of: COPD. Symptoms reviewed with family who verbalized the following symptoms: no new symptoms and no worsening symptoms. Due to no new or worsening symptoms encounter was not routed to provider for escalation. Education provided regarding infection prevention, and signs and symptoms of COVID-19 and when to seek medical attention with family who verbalized understanding. Discussed exposure protocols and quarantine from [address] you at higher risk for severe illness  and given an opportunity for questions and concerns. The family agrees to contact the COVID-19 hotline [phone] or PCP office for questions related to their healthcare. CTN/ACM/LPN provided contact information for future reference. From CDC: Are you at higher risk for severe illness?  Wash your hands often.  Avoid close contact (6 feet, which is about two arm lengths) with people who are sick.  Put distance between yourself and other people if COVID-19 is spreading in your community.  Clean and disinfect frequently touched surfaces.  Avoid all cruise travel and non-essential air travel.  Call your healthcare professional if you have concerns about COVID-19 and your underlying condition or if you are sick. For more information on steps you can take to protect yourself, see CDC's How to Lisbeth for follow-up call in 7-14 days based on severity of symptoms and risk factors.

## 2020-12-17 ENCOUNTER — PATIENT OUTREACH (OUTPATIENT)
Dept: CASE MANAGEMENT | Age: 65
End: 2020-12-17

## 2020-12-17 NOTE — PROGRESS NOTES
Patient resolved from [address] Transitions episode on 12/17/20. Discussed COVID-19 related testing which was available at this time. Test results were positive. Patient informed of results, if available? yes     Patient/family has been provided the following resources and education related to COVID-19:                         Signs, symptoms and red flags related to COVID-19            [facility] exposure and quarantine guidelines            [employer] exposure contact - [phone]            Contact for their local Department of Health               Patient currently reports that the following symptoms have improved:  no new symptoms and no worsening symptoms. No further outreach scheduled with this CTN/ACM/LPN/HC/ MA. Episode of Care resolved. Patient has this CTN/ACM/LPN/HC/MA contact information if future needs arise.

## 2022-03-18 PROBLEM — E78.5 HYPERLIPIDEMIA: Status: ACTIVE | Noted: 2020-11-29

## 2022-03-19 PROBLEM — J12.82 PNEUMONIA DUE TO COVID-19 VIRUS: Status: ACTIVE | Noted: 2020-11-29

## 2022-03-19 PROBLEM — U07.1 PNEUMONIA DUE TO COVID-19 VIRUS: Status: ACTIVE | Noted: 2020-11-29

## 2022-03-19 PROBLEM — R09.02 HYPOXIA: Status: ACTIVE | Noted: 2020-11-29

## 2022-03-19 PROBLEM — I10 HTN (HYPERTENSION): Status: ACTIVE | Noted: 2020-11-29

## 2022-03-19 PROBLEM — E11.9 TYPE II DIABETES MELLITUS (HCC): Status: ACTIVE | Noted: 2020-11-29

## 2022-03-19 PROBLEM — K21.9 GERD (GASTROESOPHAGEAL REFLUX DISEASE): Status: ACTIVE | Noted: 2020-11-29

## 2022-09-30 RX ORDER — BUDESONIDE, GLYCOPYRROLATE, AND FORMOTEROL FUMARATE 160; 9; 4.8 UG/1; UG/1; UG/1
2 AEROSOL, METERED RESPIRATORY (INHALATION) 2 TIMES DAILY
COMMUNITY

## 2022-09-30 RX ORDER — FLUCONAZOLE 150 MG/1
150 TABLET ORAL
COMMUNITY

## 2022-09-30 RX ORDER — TRAMADOL HYDROCHLORIDE 50 MG/1
50 TABLET ORAL
COMMUNITY

## 2022-09-30 RX ORDER — SPIRONOLACTONE 25 MG/1
25 TABLET ORAL DAILY
COMMUNITY

## 2022-09-30 RX ORDER — GABAPENTIN 300 MG/1
300 CAPSULE ORAL 3 TIMES DAILY
COMMUNITY

## 2022-09-30 RX ORDER — PRAVASTATIN SODIUM 40 MG/1
40 TABLET ORAL
COMMUNITY

## 2022-09-30 NOTE — PERIOP NOTES
[facility]  Ambulatory Surgery Unit  Pre-operative Instructions    Surgery/Procedure Date  10/11            Tentative Arrival Time TBD      1. On the day of your surgery/procedure, please report to the Ambulatory Surgery Unit Registration Desk and sign in at your designated time. The Ambulatory Surgery Unit is located in [facility] on the [facility] side of the [facility] across from the [address] [place] [address]. Please have all of your health insurance cards, co-payment, and a photo ID.    **TWO adults may accompany you the day of the procedure. We have limited seating available. If our waiting room is at capacity, your ride may be asked to remain in their vehicle. No one under 15 is allowed in the waiting room. Masks, fully covering the mouth and nose, are required in the waiting room. 2. You must have someone with you to drive you home, as you should not drive a car for 24 hours following anesthesia. Please make arrangements for a responsible adult friend or family member to stay with you for at least the first 24 hours after your surgery. 3. Do not have anything to eat or drink (including water, gum, mints, coffee, juice) after 11:59 PM  10/10. This may not apply to medications prescribed by your physician. (Please note below the special instructions with medications to take the morning of surgery, if applicable.)    4. We recommend you do not drink any alcoholic beverages for 24 hours before and after your surgery. 5. Contact your surgeons office for instructions on the following medications: non-steroidal anti-inflammatory drugs (i.e. Advil, Aleve), vitamins, and supplements. (Some surgeons will want you to stop these medications prior to surgery and others may allow you to take them)   **If you are currently taking Plavix, Coumadin, Aspirin and/or other blood-thinning agents, contact your surgeon for instructions. ** Your surgeon will partner with the physician prescribing these medications to determine if it is safe to stop or if you need to continue taking. Please do not stop taking these medications without instructions from your surgeon. 6. In an effort to help prevent surgical site infection, we ask that you shower with an anti-bacterial soap (i.e. Dial/Safeguard, or the soap provided to you at your preadmission testing appointment) for 3 days prior to and on the morning of surgery, using a fresh towel after each shower. (Please begin this process with fresh bed linens.) Do not apply any lotions, powders, or deodorants after the shower on the day of your procedure. If applicable, please do not shave the operative site for 48 hours prior to surgery. 7. Wear comfortable clothes. Wear glasses instead of contacts. Do not bring any jewelry or money (other than copays or fees as instructed). Do not wear make-up, particularly mascara, the morning of your surgery. Do not wear nail polish, particularly if you are having foot /hand surgery. Wear your hair loose or down, no ponytails, buns, geri pins or clips. All body piercings must be removed. 8. You should understand that if you do not follow these instructions your surgery may be cancelled. If your physical condition changes (i.e. fever, cold or flu) please contact your surgeon as soon as possible. 9. It is important that you be on time. If a situation occurs where you may be late, or if you have any questions or problems, please call [phone].    10. Your surgery time may be subject to change. You will receive a phone call the day prior to surgery to confirm your arrival time. 11. Pediatric patients: please bring a change of clothes, diapers, bottle/sippy cup, pacifier, etc.      Special Instructions:     Take all medications and inhalers, as prescribed, on the morning of surgery with a sip of water EXCEPT: insulin       Insulin Dependent Diabetic patients: Take your diabetic medications as prescribed the day before surgery. Hold all diabetic medications the day of surgery. If you are scheduled to arrive for surgery after 8:00 AM, and your AM blood sugar is >200, please call Ambulatory Surgery. I understand a pre-operative phone call will be made to verify my surgery time. In the event that I am not available, I give permission for a message to be left on my answering service and/or with another person?       yes         ___________________      ___________________      ________________  (Signature of Patient)          (Witness)                   (Date and Time) insulin

## 2022-10-05 NOTE — PERIOP NOTES
Email sent to [facility] at Dr Marii Zuniga office requesting H&P on patient

## 2022-10-10 ENCOUNTER — ANESTHESIA EVENT (OUTPATIENT)
Dept: SURGERY | Age: 67
End: 2022-10-10
Payer: MEDICARE

## 2022-10-11 ENCOUNTER — ANESTHESIA (OUTPATIENT)
Dept: SURGERY | Age: 67
End: 2022-10-11
Payer: MEDICARE

## 2022-10-11 ENCOUNTER — HOSPITAL ENCOUNTER (OUTPATIENT)
Age: 67
Setting detail: OUTPATIENT SURGERY
Discharge: HOME | End: 2022-10-11
Attending: OPHTHALMOLOGY | Admitting: OPHTHALMOLOGY
Payer: MEDICARE

## 2022-10-11 VITALS
HEIGHT: 63 IN | WEIGHT: 207 LBS | SYSTOLIC BLOOD PRESSURE: 127 MMHG | TEMPERATURE: 97.6 F | HEART RATE: 60 BPM | DIASTOLIC BLOOD PRESSURE: 48 MMHG | BODY MASS INDEX: 36.68 KG/M2 | OXYGEN SATURATION: 99 % | RESPIRATION RATE: 17 BRPM

## 2022-10-11 PROBLEM — H25.812 COMBINED FORM OF AGE-RELATED CATARACT, LEFT EYE: Status: ACTIVE | Noted: 2022-10-11

## 2022-10-11 LAB
GLUCOSE BLD STRIP.AUTO-MCNC: 98 MG/DL (ref 65–117)
GLUCOSE BLD STRIP.AUTO-MCNC: 99 MG/DL (ref 65–117)
SERVICE CMNT-IMP: NORMAL
SERVICE CMNT-IMP: NORMAL

## 2022-10-11 PROCEDURE — 77030029561: Performed by: OPHTHALMOLOGY

## 2022-10-11 PROCEDURE — 76210000046 HC AMBSU PH II REC FIRST 0.5 HR: Performed by: OPHTHALMOLOGY

## 2022-10-11 PROCEDURE — 82962 GLUCOSE BLOOD TEST: CPT

## 2022-10-11 PROCEDURE — 74011000250 HC RX REV CODE- 250

## 2022-10-11 PROCEDURE — 74011250636 HC RX REV CODE- 250/636: Performed by: OPHTHALMOLOGY

## 2022-10-11 PROCEDURE — 76030000000 HC AMB SURG OR TIME 0.5 TO 1: Performed by: OPHTHALMOLOGY

## 2022-10-11 PROCEDURE — 2709999900 HC NON-CHARGEABLE SUPPLY: Performed by: OPHTHALMOLOGY

## 2022-10-11 PROCEDURE — V2632 POST CHMBR INTRAOCULAR LENS: HCPCS | Performed by: OPHTHALMOLOGY

## 2022-10-11 PROCEDURE — 74011250636 HC RX REV CODE- 250/636: Performed by: NURSE ANESTHETIST, CERTIFIED REGISTERED

## 2022-10-11 PROCEDURE — 74011000250 HC RX REV CODE- 250: Performed by: OPHTHALMOLOGY

## 2022-10-11 PROCEDURE — 76060000061 HC AMB SURG ANES 0.5 TO 1 HR: Performed by: OPHTHALMOLOGY

## 2022-10-11 RX ORDER — SODIUM CHLORIDE 0.9 % (FLUSH) 0.9 %
5-40 SYRINGE (ML) INJECTION EVERY 8 HOURS
Status: DISCONTINUED | OUTPATIENT
Start: 2022-10-11 | End: 2022-10-11 | Stop reason: HOSPADM

## 2022-10-11 RX ORDER — NEOMYCIN SULFATE, POLYMYXIN B SULFATE AND DEXAMETHASONE 3.5; 10000; 1 MG/ML; [USP'U]/ML; MG/ML
1 SUSPENSION/ DROPS OPHTHALMIC 2 TIMES DAILY
Status: DISCONTINUED | OUTPATIENT
Start: 2022-10-11 | End: 2022-10-11 | Stop reason: HOSPADM

## 2022-10-11 RX ORDER — FENTANYL CITRATE 50 UG/ML
INJECTION, SOLUTION INTRAMUSCULAR; INTRAVENOUS AS NEEDED
Status: DISCONTINUED | OUTPATIENT
Start: 2022-10-11 | End: 2022-10-11 | Stop reason: HOSPADM

## 2022-10-11 RX ORDER — PHENYLEPHRINE HYDROCHLORIDE 25 MG/ML
1 SOLUTION/ DROPS OPHTHALMIC
Status: COMPLETED | OUTPATIENT
Start: 2022-10-11 | End: 2022-10-11

## 2022-10-11 RX ORDER — CYCLOPENTOLATE HYDROCHLORIDE 20 MG/ML
1 SOLUTION/ DROPS OPHTHALMIC
Status: COMPLETED | OUTPATIENT
Start: 2022-10-11 | End: 2022-10-11

## 2022-10-11 RX ORDER — LIDOCAINE HYDROCHLORIDE 10 MG/ML
1 INJECTION, SOLUTION EPIDURAL; INFILTRATION; INTRACAUDAL; PERINEURAL ONCE
Status: COMPLETED | OUTPATIENT
Start: 2022-10-11 | End: 2022-10-11

## 2022-10-11 RX ORDER — MIDAZOLAM HYDROCHLORIDE 1 MG/ML
INJECTION, SOLUTION INTRAMUSCULAR; INTRAVENOUS AS NEEDED
Status: DISCONTINUED | OUTPATIENT
Start: 2022-10-11 | End: 2022-10-11 | Stop reason: HOSPADM

## 2022-10-11 RX ORDER — DIPHENHYDRAMINE HYDROCHLORIDE 50 MG/ML
12.5 INJECTION, SOLUTION INTRAMUSCULAR; INTRAVENOUS AS NEEDED
Status: DISCONTINUED | OUTPATIENT
Start: 2022-10-11 | End: 2022-10-11 | Stop reason: HOSPADM

## 2022-10-11 RX ORDER — LIDOCAINE HYDROCHLORIDE 10 MG/ML
0.1 INJECTION, SOLUTION EPIDURAL; INFILTRATION; INTRACAUDAL; PERINEURAL AS NEEDED
Status: DISCONTINUED | OUTPATIENT
Start: 2022-10-11 | End: 2022-10-11 | Stop reason: HOSPADM

## 2022-10-11 RX ORDER — SODIUM CHLORIDE, SODIUM LACTATE, POTASSIUM CHLORIDE, CALCIUM CHLORIDE 600; 310; 30; 20 MG/100ML; MG/100ML; MG/100ML; MG/100ML
25 INJECTION, SOLUTION INTRAVENOUS CONTINUOUS
Status: DISCONTINUED | OUTPATIENT
Start: 2022-10-11 | End: 2022-10-11 | Stop reason: HOSPADM

## 2022-10-11 RX ORDER — SODIUM CHLORIDE 0.9 % (FLUSH) 0.9 %
5-40 SYRINGE (ML) INJECTION AS NEEDED
Status: DISCONTINUED | OUTPATIENT
Start: 2022-10-11 | End: 2022-10-11 | Stop reason: HOSPADM

## 2022-10-11 RX ORDER — PREDNISOLONE ACETATE 10 MG/ML
2 SUSPENSION/ DROPS OPHTHALMIC 2 TIMES DAILY
Status: DISCONTINUED | OUTPATIENT
Start: 2022-10-11 | End: 2022-10-11 | Stop reason: HOSPADM

## 2022-10-11 RX ORDER — SODIUM CHLORIDE 9 MG/ML
25 INJECTION, SOLUTION INTRAVENOUS ONCE
Status: COMPLETED | OUTPATIENT
Start: 2022-10-11 | End: 2022-10-11

## 2022-10-11 RX ORDER — LIDOCAINE HYDROCHLORIDE 40 MG/ML
2 INJECTION, SOLUTION RETROBULBAR; TOPICAL ONCE
Status: COMPLETED | OUTPATIENT
Start: 2022-10-11 | End: 2022-10-11

## 2022-10-11 RX ORDER — TROPICAMIDE 10 MG/ML
1 SOLUTION/ DROPS OPHTHALMIC
Status: COMPLETED | OUTPATIENT
Start: 2022-10-11 | End: 2022-10-11

## 2022-10-11 RX ORDER — SODIUM CHLORIDE 9 MG/ML
INJECTION, SOLUTION INTRAVENOUS
Status: DISCONTINUED | OUTPATIENT
Start: 2022-10-11 | End: 2022-10-11 | Stop reason: HOSPADM

## 2022-10-11 RX ORDER — PROPARACAINE HYDROCHLORIDE 5 MG/ML
1 SOLUTION/ DROPS OPHTHALMIC
Status: COMPLETED | OUTPATIENT
Start: 2022-10-11 | End: 2022-10-11

## 2022-10-11 RX ORDER — ONDANSETRON 2 MG/ML
4 INJECTION INTRAMUSCULAR; INTRAVENOUS AS NEEDED
Status: DISCONTINUED | OUTPATIENT
Start: 2022-10-11 | End: 2022-10-11 | Stop reason: HOSPADM

## 2022-10-11 RX ORDER — FENTANYL CITRATE 50 UG/ML
25 INJECTION, SOLUTION INTRAMUSCULAR; INTRAVENOUS
Status: DISCONTINUED | OUTPATIENT
Start: 2022-10-11 | End: 2022-10-11 | Stop reason: HOSPADM

## 2022-10-11 RX ORDER — TROPICAMIDE 10 MG/ML
SOLUTION/ DROPS OPHTHALMIC
Status: COMPLETED
Start: 2022-10-11 | End: 2022-10-11

## 2022-10-11 RX ADMIN — SODIUM CHLORIDE 25 ML/HR: 9 INJECTION, SOLUTION INTRAVENOUS at 09:10

## 2022-10-11 RX ADMIN — CYCLOPENTOLATE HYDROCHLORIDE 1 DROP: 20 SOLUTION/ DROPS OPHTHALMIC at 09:09

## 2022-10-11 RX ADMIN — CYCLOPENTOLATE HYDROCHLORIDE 1 DROP: 20 SOLUTION/ DROPS OPHTHALMIC at 09:28

## 2022-10-11 RX ADMIN — PHENYLEPHRINE HYDROCHLORIDE 1 DROP: 25 SOLUTION/ DROPS OPHTHALMIC at 09:18

## 2022-10-11 RX ADMIN — TROPICAMIDE 1 DROP: 10 SOLUTION/ DROPS OPHTHALMIC at 09:24

## 2022-10-11 RX ADMIN — TROPICAMIDE 1 DROP: 10 SOLUTION/ DROPS OPHTHALMIC at 09:09

## 2022-10-11 RX ADMIN — TROPICAMIDE 1 DROP: 10 SOLUTION/ DROPS OPHTHALMIC at 09:18

## 2022-10-11 RX ADMIN — CYCLOPENTOLATE HYDROCHLORIDE 1 DROP: 20 SOLUTION/ DROPS OPHTHALMIC at 09:24

## 2022-10-11 RX ADMIN — FENTANYL CITRATE 12.5 MCG: 50 INJECTION, SOLUTION INTRAMUSCULAR; INTRAVENOUS at 10:00

## 2022-10-11 RX ADMIN — PHENYLEPHRINE HYDROCHLORIDE 1 DROP: 25 SOLUTION/ DROPS OPHTHALMIC at 09:24

## 2022-10-11 RX ADMIN — MIDAZOLAM HYDROCHLORIDE 1 MG: 1 INJECTION, SOLUTION INTRAMUSCULAR; INTRAVENOUS at 09:48

## 2022-10-11 RX ADMIN — PHENYLEPHRINE HYDROCHLORIDE 1 DROP: 25 SOLUTION/ DROPS OPHTHALMIC at 09:28

## 2022-10-11 RX ADMIN — TROPICAMIDE 1 DROP: 10 SOLUTION/ DROPS OPHTHALMIC at 09:28

## 2022-10-11 RX ADMIN — SODIUM CHLORIDE: 0.9 INJECTION, SOLUTION INTRAVENOUS at 09:48

## 2022-10-11 RX ADMIN — PHENYLEPHRINE HYDROCHLORIDE 1 DROP: 25 SOLUTION/ DROPS OPHTHALMIC at 09:09

## 2022-10-11 RX ADMIN — PROPARACAINE HYDROCHLORIDE 1 DROP: 5 SOLUTION/ DROPS OPHTHALMIC at 09:09

## 2022-10-11 RX ADMIN — MIDAZOLAM HYDROCHLORIDE 1 MG: 1 INJECTION, SOLUTION INTRAMUSCULAR; INTRAVENOUS at 09:54

## 2022-10-11 RX ADMIN — PROPARACAINE HYDROCHLORIDE 1 DROP: 5 SOLUTION/ DROPS OPHTHALMIC at 09:28

## 2022-10-11 RX ADMIN — CYCLOPENTOLATE HYDROCHLORIDE 1 DROP: 20 SOLUTION/ DROPS OPHTHALMIC at 09:18

## 2022-10-11 NOTE — PERIOP NOTES
[place] [facility]  3/[birth year]  [id number]    Situation:  Verbal report given from: Demond Hernandez  Procedure: Procedure(s):  LEFT EYE PHACOEMULSIFICATION WITH INTRAOCULAR LENS IMPLANTATION    Background:    Preoperative diagnosis: VISUALLY SIGNIFICANT CATARACT LEFT EYE    Postoperative diagnosis: VISUALLY SIGNIFICANT CATARACT LEFT EYE    :  Dr. Hazel Zelaya    Assistant(s): Circ-1: Annabelle Bennett RN  Scrub Tech-1: Anthony CURRY, RT    Specimens: * No specimens in log *    Assessment:  Intra-procedure medications         Anesthesia gave intra-procedure sedation and medications, see anesthesia flow sheet     Intravenous fluids: LR@ KVO     Vital signs stable       Recommendation:    Permission to share finding with family : yes

## 2022-10-11 NOTE — OP NOTES
Operative Note    Patient: Jerrica Dunaway  YOB: 1955  MRN: [id number]    Date of Procedure: 10/11/2022     Preop Diagnosis: VISUALLY SIGNIFICANT CATARACT LEFT EYE  Postop Diagnosis: VISUALLY SIGNIFICANT CATARACT LEFT EYE     Procedure/Surgery: LEFT EYE PHACOEMULSIFICATION WITH INTRAOCULAR LENS IMPLANTATION    Surgeon(s) and Role:     Oswaldo Mendoza MD - Primary    Anesthesia: MAC     Blood Loss: None    Complications: None    Speciman Removed: * No specimens in log *     PHACO TIME:   00:52.7 seconds at an average power of 8.7%. CDE 5.20    Implants:   Implant Name Type Inv. Item Serial No.  Lot No. LRB No. Used Action   ACRYSOF ASPHERIC UV ABSORBING IOL, [id number] 23.5D Other  [id number]  N/A Left 1 Implanted       Procedure: The patient was consented. The operative eye was confirmed and marked in the preoperative holding area. Dilation was confirmed and the patient was brought into the operating room. The eye was prepped and draped in the typical aseptic fashion. Lid speculum was placed. Paracentesis was made and preservative-free lidocaine was injected into the anterior chamber followed by viscoelastic (Viscoat) to fill the anterior chamber. A triplanar self-sealing temporal clear cornea incision was made with a 2.8 mm keratome. A continuous curvilinear capsulorrhexis was initiated with a cystotome and completed with Utrata forceps. Hydrodissection and hydrodelination were performed with confirmed free rotation of the nucleus. Phacoemulsification was peformed in a divide-and-conquer fashion followed by cortical removal with irrigation/aspiration. The capsular bag was reinflated with viscoelastic (Healon). The lens type and power was confirmed and the lens injected into the capsular bag. Residual viscoelastic was removed and the eye was formed with BSS. Stromal hydration was performed. The wound and paracentesis were watertight at the end of the case.   The lens was well centered and the pupil was round at the end of the case. The lid speculum was removed. Postoperative eyedrops of delvin/poly/dex and prednisolone were instilled in the eye. An eye shield was placed over the eye. The patient tolerated the surgery well and was taken to the postoperative holding area in a stable condition.     Angelina Franz MD    10/11/2022  10:25 AM

## 2022-10-11 NOTE — DISCHARGE INSTRUCTIONS
CATARACT POST-OP INSTRUCTIONS    DAY OF SURGERY:  Remove patch & start drops 2 hours after you get home from surgery. 1. Antibiotic - TAN TOP (Ofloxacin)                  1 drop in operated eye every 2 hours while awake                    2. Steroid - PINK OR WHITE TOP (Prednisolone acetate)                  SHAKE WELL BEFORE USING (10-15X)                  1 drop in operated eye every 2 hours while awake.                                                                        ____________________________________________________    STARTING ON THE DAY AFTER SURGERY:    1. Antibiotic - TAN TOP (Ofloxacin)        1 drop in operated eye 4 x per day for 1 week then STOP                  2. Steroid - PINK OR WHITE TOP (Prednisolone acetate)        SHAKE WELL BEFORE USING (10-15X)        1 drop in operated eye every 2hours while awake x 1  week, then        1 drop in operated eye 4x per day x 2  week then,        1 drop in operated eye 3x per day x 2  week then,        1 drop in operated eye 2x per day x 2  week then,        1 drop in operated eye 1x per day x 2  week then,                                                 SPACE OUT ALL EYE DROPS BY 5 MINUTES SO YOU            DON'T [address] OUT ONE MEDICATION WITH THE OTHER.      EYE CARE:    1.  DO NOT RUB YOUR EYE!  2.  Wear eye shield when sleeping for 1st week after surgery  3. No heavy lifting (over 15 pounds) or bending below the waist or straining (hard coughing/sneezing/bowel movements - take stool softener if needed)  for 5 days after surgery. 4.  Do not get dirty water in your surgery eye. You can shower if you cover and close your surgery eye.  5.  No makeup for 5 days after surgery on or around the surgery eye. No gardening or swimming for 3 weeks. Wear safety glasses whenever dirt may get in your eyes.   6.  Use all other eye medications you were on before surgery unless instructed by your eye doctor not to do so.  7.  Wash your hands before touching your eye or your eye drops. Do not contaminate the tips of the bottles. 8.  Call immediately for severe pain, worse redness, decreased vision, new flashing lights or floaters. A scratchy sensation is normal and should get better as your eye heals. 9.  Please call with any questions about your postop care. You can reach Dr. Hamzah Delgado at phone number:  [phone] (office) or [phone] (cell) in an emergency. DO NOT TAKE SLEEPING MEDICATIONS OR ANTIANXIETY MEDICATIONS WHILE TAKING NARCOTIC PAIN MEDICATIONS,  ESPECIALLY THE NIGHT OF ANESTHESIA. CPAP PATIENTS BE SURE TO WEAR MACHINE WHENEVER NAPPING OR SLEEPING. DISCHARGE SUMMARY from Nurse    The following personal items collected during your admission are returned to you:   Dental Appliance: Dental Appliances: Uppers, Lowers, With patient  Vision: Visual Aid: Glasses, With patient (placed in PACU)  Hearing Aid:    Jewelry:    Clothing:    Other Valuables:    Valuables sent to safe:        PATIENT INSTRUCTIONS:    After general anesthesia or intravenous sedation, for 24 hours or while taking prescription Narcotics:  Someone should be with you for the next 24 hours. For your own safety, a responsible adult must drive you home. Limit your activities  Recommended activity: Rest today, up with assistance today. Do not climb stairs or shower unattended for the next 24 hours. Please start with a soft bland diet and advance as tolerated (no nausea) to regular diet. Do not drive and operate hazardous machinery  Do not make important personal or business decisions  Do  not drink alcoholic beverages  If you have not urinated within 8 hours after discharge, please contact your surgeon on call.     Report the following to your surgeon:  Excessive pain, swelling, redness or odor of or around the surgical area  Temperature over 100.5  Nausea and vomiting lasting longer than 4 hours or if unable to take medications      You will receive a Post Operative Call from one of the Recovery Room Nurses on the day after your surgery to check on you. It is very important for us to know how you are recovering after your surgery. If you have an issue or need to speak with someone, please call your surgeon, do not wait for the post operative call. You may receive an e-mail or letter in the mail from [employer] regarding your experience with us in the Ambulatory Surgery Unit. Your feedback is valuable to us and we appreciate your participation in the survey. .    If the above instructions are not adequate or you are having problems after your surgery, call the physician at his office number. We wish youre a speedy recovery ? What to do at Home:      *  Please give a list of your current medications to your Primary Care Provider. *  Please update this list whenever your medications are discontinued, doses are      changed, or new medications (including over-the-counter products) are added. *  Please carry medication information at all times in case of emergency situations. If you have not received your influenza and/or pneumococcal vaccine, please follow up with your primary care physician. The discharge information has been reviewed with the patient and caregiver. The patient and caregiver verbalized understanding. TO PREVENT AN INFECTION      [address] YOUR HANDS    To prevent infection, good handwashing is the most important thing you or your caregiver can do. Wash your hands with soap and water or use the hand  we gave you before you touch any wounds. USE CLEAN SHEETS    Use freshly cleaned sheets on your bed after surgery. To keep the surgery site clean, do not allow pets to sleep with you while your wound is still healing. STOP SMOKING    Stop smoking, or at least cut back on smoking    Smoking slows your healing. CONTROL YOUR BLOOD SUGAR    High blood sugars slow wound healing.     If you are diabetic, control your blood sugar levels before and after your surgery.

## 2022-10-11 NOTE — ANESTHESIA POSTPROCEDURE EVALUATION
Procedure(s):  LEFT EYE PHACOEMULSIFICATION WITH INTRAOCULAR LENS IMPLANTATION. MAC    Anesthesia Post Evaluation      Multimodal analgesia: multimodal analgesia used between 6 hours prior to anesthesia start to PACU discharge  Patient location during evaluation: PACU  Patient participation: complete - patient participated  Level of consciousness: awake and alert  Pain score: 0  Airway patency: patent  Anesthetic complications: no  Cardiovascular status: acceptable  Respiratory status: acceptable  Hydration status: acceptable  Post anesthesia nausea and vomiting:  none  Final Post Anesthesia Temperature Assessment:  Normothermia (36.0-37.5 degrees C)      INITIAL Post-op Vital signs:   Vitals Value Taken Time   /48 10/11/22 1037   Temp 36.4 °C (97.6 °F) 10/11/22 1037   Pulse 59 10/11/22 1043   Resp 18 10/11/22 1043   SpO2 100 % 10/11/22 1043   Vitals shown include unvalidated device data.

## 2022-10-11 NOTE — PERIOP NOTES
Patient: Srinivas Gonsalves MRN: [id number]  SSN: [id number]   YOB: 1955  Age: 75 y.o. Sex: female     Patient is status post Procedure(s):  LEFT EYE PHACOEMULSIFICATION WITH INTRAOCULAR LENS IMPLANTATION. Surgeon(s) and Role:     Itzel Gonzales MD - Primary    Local/Dose/Irrigation: SEE MAR                  Peripheral IV 10/11/22 Anterior; Left Forearm (Active)   Site Assessment Clean, dry, & intact 10/11/22 0903   Phlebitis Assessment 0 10/11/22 0903   Infiltration Assessment 0 10/11/22 0903   Dressing Status Clean, dry, & intact 10/11/22 0903   Dressing Type Tape;Transparent 10/11/22 0903   Hub Color/Line Status Blue; Infusing 10/11/22 1848                           Dressing/Packing:  Incision 10/11/22 Eye Left-Dressing/Treatment: Eye shield;Tape/Soft cloth adhesive tape (SECURED WITH TAPE BY MD.) (10/11/22 0900)

## 2022-10-11 NOTE — PERIOP NOTES
Permission received to review discharge instructions and discuss private health information with  Red Jessica. Patient states that family/friend will be with them for at least 24 hours following today's procedure. Warm blanket applied.

## 2022-10-11 NOTE — PERIOP NOTES
1022 pt arrived to PACU. Pt alert and oriented. Pt states 1/10 burning pain in left eye. VSS. 1027 Blood sugar 99.  1030 Pt states she has no eye drops at home. Her pharmacy called and no presciptions are there. Called Dr. Love Esparza. [facility] and [facility] will give her the prednisolone. 1040 Pts pain has subsided. VSS. Pt has cane, sweater, and glasses with her. Pt meets discharge criteria.

## 2022-10-11 NOTE — ANESTHESIA PREPROCEDURE EVALUATION
Relevant Problems   RESPIRATORY SYSTEM   (+) Pneumonia due to COVID-19 virus      CARDIOVASCULAR   (+) HTN (hypertension)      GASTROINTESTINAL   (+) GERD (gastroesophageal reflux disease)      ENDOCRINE   (+) Type II diabetes mellitus (HCC)       Anesthetic History   No history of anesthetic complications            Review of Systems / Medical History  Patient summary reviewed, nursing notes reviewed and pertinent labs reviewed    Pulmonary    COPD    Sleep apnea: CPAP  Smoker         Neuro/Psych   Within defined limits           Cardiovascular    Hypertension          Past MI (2010), CAD and cardiac stents (2010)    Exercise tolerance: <4 METS  Comments: 03/21 Cath: Non-obstructive CAD with ptent RCA stent    06/16 ECHO= EF 55-60%, no signif valvular abnormalities       GI/Hepatic/Renal     GERD: well controlled           Endo/Other    Diabetes: type 2    Cancer     Other Findings   Comments: Cataracts         Physical Exam    Airway  Mallampati: I  TM Distance: < 4 cm  Neck ROM: decreased range of motion, short neck   Mouth opening: Normal     Cardiovascular    Rhythm: regular  Rate: normal         Dental    Dentition: Full lower dentures and Full upper dentures     Pulmonary  Breath sounds clear to auscultation               Abdominal  GI exam deferred       Other Findings            Anesthetic Plan    ASA: 3  Anesthesia type: MAC          Induction: Intravenous  Anesthetic plan and risks discussed with: Patient      Took BB this am. preop glucose 98

## 2022-10-13 NOTE — PERIOP NOTES
[facility]  Ambulatory Surgery Unit  Pre-operative Instructions    Surgery/Procedure Date  Tuesday October 25th 2022            Tentative Arrival Time TBD      1. On the day of your surgery/procedure, please report to the Ambulatory Surgery Unit Registration Desk and sign in at your designated time. The Ambulatory Surgery Unit is located in [facility] on the [facility] side of the [facility] across from the [address] [place] [address]. Please have all of your health insurance cards, co-payment, and a photo ID.    **TWO adults may accompany you the day of the procedure. We have limited seating available. If our waiting room is at capacity, your ride may be asked to remain in their vehicle. No one under 15 is allowed in the waiting room. 2. You must have someone with you to drive you home, as you should not drive a car for 24 hours following anesthesia. Please make arrangements for a responsible adult friend or family member to stay with you for at least the first 24 hours after your surgery. 3. Do not have anything to eat or drink (including water, gum, mints, coffee, juice) after 11:59 PM on Monday 10/24. This may not apply to medications prescribed by your physician. (Please note below the special instructions with medications to take the morning of surgery, if applicable.)    4. We recommend you do not drink any alcoholic beverages for 24 hours before and after your surgery. 5. Contact your surgeons office for instructions on the following medications: non-steroidal anti-inflammatory drugs (i.e. Advil, Aleve), vitamins, and supplements. (Some surgeons will want you to stop these medications prior to surgery and others may allow you to take them)   **If you are currently taking Plavix, Coumadin, Aspirin and/or other blood-thinning agents, contact your surgeon for instructions. ** Your surgeon will partner with the physician prescribing these medications to determine if it is safe to stop or if you need to continue taking. Please do not stop taking these medications without instructions from your surgeon. 6. In an effort to help prevent surgical site infection, we ask that you shower with an anti-bacterial soap (i.e. Dial/Safeguard, or the soap provided to you at your preadmission testing appointment) for 3 days prior to and on the morning of surgery, using a fresh towel after each shower. (Please begin this process with fresh bed linens.) Do not apply any lotions, powders, or deodorants after the shower on the day of your procedure. If applicable, please do not shave the operative site for 48 hours prior to surgery. 7. Wear comfortable clothes. Wear glasses instead of contacts. Do not bring any jewelry or money (other than copays or fees as instructed). Do not wear make-up, particularly mascara, the morning of your surgery. Do not wear nail polish, particularly if you are having foot /hand surgery. Wear your hair loose or down, no ponytails, buns, geri pins or clips. All body piercings must be removed. 8. You should understand that if you do not follow these instructions your surgery may be cancelled. If your physical condition changes (i.e. fever, cold or flu) please contact your surgeon as soon as possible. 9. It is important that you be on time. If a situation occurs where you may be late, or if you have any questions or problems, please call [phone].    10. Your surgery time may be subject to change. You will receive a phone call the day prior to surgery to confirm your arrival time. 11. Pediatric patients: please bring a change of clothes, diapers, bottle/sippy cup, pacifier, etc.      Special Instructions: Take all medications and inhalers, as prescribed, on the morning of surgery with a sip of water EXCEPT: insulin      Insulin Dependent Diabetic patients: Take your diabetic medications as prescribed the day before surgery.   Hold all diabetic medications the day of surgery. If you are scheduled to arrive for surgery after 8:00 AM, and your AM blood sugar is >200, please call Ambulatory Surgery. I understand a pre-operative phone call will be made to verify my surgery time. In the event that I am not available, I give permission for a message to be left on my answering service and/or with another person?       Yes    Patient states she is familiar with all instructions          ___________________      ___________________      ________________  (Signature of Patient)          (Witness)                   (Date and Time)

## 2022-10-19 NOTE — PERIOP NOTES
Emailed Ambar Griffiths at Dr [facility] requested orders to be faxed to [facility] pat

## 2022-10-24 ENCOUNTER — ANESTHESIA EVENT (OUTPATIENT)
Dept: SURGERY | Age: 67
End: 2022-10-24
Payer: MEDICARE

## 2022-10-25 ENCOUNTER — ANESTHESIA (OUTPATIENT)
Dept: SURGERY | Age: 67
End: 2022-10-25
Payer: MEDICARE

## 2022-10-25 ENCOUNTER — HOSPITAL ENCOUNTER (OUTPATIENT)
Age: 67
Setting detail: OUTPATIENT SURGERY
Discharge: HOME | End: 2022-10-25
Attending: OPHTHALMOLOGY | Admitting: OPHTHALMOLOGY
Payer: MEDICARE

## 2022-10-25 VITALS
TEMPERATURE: 97 F | DIASTOLIC BLOOD PRESSURE: 55 MMHG | HEART RATE: 65 BPM | SYSTOLIC BLOOD PRESSURE: 120 MMHG | WEIGHT: 203 LBS | BODY MASS INDEX: 35.97 KG/M2 | OXYGEN SATURATION: 97 % | RESPIRATION RATE: 15 BRPM | HEIGHT: 63 IN

## 2022-10-25 PROBLEM — H25.811 COMBINED FORM OF AGE-RELATED CATARACT, RIGHT EYE: Status: ACTIVE | Noted: 2022-10-25

## 2022-10-25 LAB
GLUCOSE BLD STRIP.AUTO-MCNC: 109 MG/DL (ref 65–117)
GLUCOSE BLD STRIP.AUTO-MCNC: 137 MG/DL (ref 65–117)
SERVICE CMNT-IMP: ABNORMAL
SERVICE CMNT-IMP: NORMAL

## 2022-10-25 PROCEDURE — V2632 POST CHMBR INTRAOCULAR LENS: HCPCS | Performed by: OPHTHALMOLOGY

## 2022-10-25 PROCEDURE — 74011000250 HC RX REV CODE- 250: Performed by: OPHTHALMOLOGY

## 2022-10-25 PROCEDURE — 76030000000 HC AMB SURG OR TIME 0.5 TO 1: Performed by: OPHTHALMOLOGY

## 2022-10-25 PROCEDURE — 74011250636 HC RX REV CODE- 250/636: Performed by: NURSE ANESTHETIST, CERTIFIED REGISTERED

## 2022-10-25 PROCEDURE — 82962 GLUCOSE BLOOD TEST: CPT

## 2022-10-25 PROCEDURE — 74011250637 HC RX REV CODE- 250/637: Performed by: OPHTHALMOLOGY

## 2022-10-25 PROCEDURE — 76060000061 HC AMB SURG ANES 0.5 TO 1 HR: Performed by: OPHTHALMOLOGY

## 2022-10-25 PROCEDURE — 74011250636 HC RX REV CODE- 250/636: Performed by: ANESTHESIOLOGY

## 2022-10-25 PROCEDURE — 74011000250 HC RX REV CODE- 250

## 2022-10-25 PROCEDURE — 74011250636 HC RX REV CODE- 250/636: Performed by: OPHTHALMOLOGY

## 2022-10-25 PROCEDURE — 2709999900 HC NON-CHARGEABLE SUPPLY: Performed by: OPHTHALMOLOGY

## 2022-10-25 PROCEDURE — 76210000050 HC AMBSU PH II REC 0.5 TO 1 HR: Performed by: OPHTHALMOLOGY

## 2022-10-25 RX ORDER — ONDANSETRON 2 MG/ML
4 INJECTION INTRAMUSCULAR; INTRAVENOUS AS NEEDED
Status: DISCONTINUED | OUTPATIENT
Start: 2022-10-25 | End: 2022-10-25 | Stop reason: HOSPADM

## 2022-10-25 RX ORDER — SODIUM CHLORIDE 9 MG/ML
INJECTION, SOLUTION INTRAVENOUS
Status: DISCONTINUED | OUTPATIENT
Start: 2022-10-25 | End: 2022-10-25 | Stop reason: HOSPADM

## 2022-10-25 RX ORDER — TROPICAMIDE 10 MG/ML
SOLUTION/ DROPS OPHTHALMIC
Status: COMPLETED
Start: 2022-10-25 | End: 2022-10-25

## 2022-10-25 RX ORDER — LIDOCAINE HYDROCHLORIDE 10 MG/ML
0.1 INJECTION, SOLUTION EPIDURAL; INFILTRATION; INTRACAUDAL; PERINEURAL AS NEEDED
Status: DISCONTINUED | OUTPATIENT
Start: 2022-10-25 | End: 2022-10-25 | Stop reason: HOSPADM

## 2022-10-25 RX ORDER — OXYCODONE AND ACETAMINOPHEN 5; 325 MG/1; MG/1
1 TABLET ORAL
Status: DISCONTINUED | OUTPATIENT
Start: 2022-10-25 | End: 2022-10-25 | Stop reason: HOSPADM

## 2022-10-25 RX ORDER — PROPARACAINE HYDROCHLORIDE 5 MG/ML
1 SOLUTION/ DROPS OPHTHALMIC
Status: COMPLETED | OUTPATIENT
Start: 2022-10-25 | End: 2022-10-25

## 2022-10-25 RX ORDER — SODIUM CHLORIDE 0.9 % (FLUSH) 0.9 %
5-40 SYRINGE (ML) INJECTION EVERY 8 HOURS
Status: DISCONTINUED | OUTPATIENT
Start: 2022-10-25 | End: 2022-10-25 | Stop reason: HOSPADM

## 2022-10-25 RX ORDER — NEOMYCIN SULFATE, POLYMYXIN B SULFATE AND DEXAMETHASONE 3.5; 10000; 1 MG/ML; [USP'U]/ML; MG/ML
1 SUSPENSION/ DROPS OPHTHALMIC ONCE
Status: COMPLETED | OUTPATIENT
Start: 2022-10-25 | End: 2022-10-25

## 2022-10-25 RX ORDER — SODIUM CHLORIDE, SODIUM LACTATE, POTASSIUM CHLORIDE, CALCIUM CHLORIDE 600; 310; 30; 20 MG/100ML; MG/100ML; MG/100ML; MG/100ML
25 INJECTION, SOLUTION INTRAVENOUS CONTINUOUS
Status: DISCONTINUED | OUTPATIENT
Start: 2022-10-25 | End: 2022-10-25 | Stop reason: HOSPADM

## 2022-10-25 RX ORDER — SODIUM CHLORIDE 0.9 % (FLUSH) 0.9 %
5-40 SYRINGE (ML) INJECTION AS NEEDED
Status: DISCONTINUED | OUTPATIENT
Start: 2022-10-25 | End: 2022-10-25 | Stop reason: HOSPADM

## 2022-10-25 RX ORDER — LIDOCAINE HYDROCHLORIDE 40 MG/ML
2 INJECTION, SOLUTION RETROBULBAR; TOPICAL ONCE
Status: COMPLETED | OUTPATIENT
Start: 2022-10-25 | End: 2022-10-25

## 2022-10-25 RX ORDER — MIDAZOLAM HYDROCHLORIDE 1 MG/ML
INJECTION, SOLUTION INTRAMUSCULAR; INTRAVENOUS AS NEEDED
Status: DISCONTINUED | OUTPATIENT
Start: 2022-10-25 | End: 2022-10-25 | Stop reason: HOSPADM

## 2022-10-25 RX ORDER — TROPICAMIDE 10 MG/ML
1 SOLUTION/ DROPS OPHTHALMIC
Status: COMPLETED | OUTPATIENT
Start: 2022-10-25 | End: 2022-10-25

## 2022-10-25 RX ORDER — DIPHENHYDRAMINE HYDROCHLORIDE 50 MG/ML
12.5 INJECTION, SOLUTION INTRAMUSCULAR; INTRAVENOUS AS NEEDED
Status: DISCONTINUED | OUTPATIENT
Start: 2022-10-25 | End: 2022-10-25 | Stop reason: HOSPADM

## 2022-10-25 RX ORDER — PHENYLEPHRINE HYDROCHLORIDE 25 MG/ML
1 SOLUTION/ DROPS OPHTHALMIC
Status: COMPLETED | OUTPATIENT
Start: 2022-10-25 | End: 2022-10-25

## 2022-10-25 RX ORDER — LIDOCAINE HYDROCHLORIDE 10 MG/ML
1 INJECTION, SOLUTION EPIDURAL; INFILTRATION; INTRACAUDAL; PERINEURAL ONCE
Status: COMPLETED | OUTPATIENT
Start: 2022-10-25 | End: 2022-10-25

## 2022-10-25 RX ORDER — ERYTHROMYCIN 5 MG/G
OINTMENT OPHTHALMIC ONCE
Status: COMPLETED | OUTPATIENT
Start: 2022-10-25 | End: 2022-10-25

## 2022-10-25 RX ORDER — CYCLOPENTOLATE HYDROCHLORIDE 20 MG/ML
1 SOLUTION/ DROPS OPHTHALMIC
Status: COMPLETED | OUTPATIENT
Start: 2022-10-25 | End: 2022-10-25

## 2022-10-25 RX ORDER — FENTANYL CITRATE 50 UG/ML
INJECTION, SOLUTION INTRAMUSCULAR; INTRAVENOUS AS NEEDED
Status: DISCONTINUED | OUTPATIENT
Start: 2022-10-25 | End: 2022-10-25 | Stop reason: HOSPADM

## 2022-10-25 RX ORDER — SODIUM CHLORIDE 9 MG/ML
25 INJECTION, SOLUTION INTRAVENOUS ONCE
Status: COMPLETED | OUTPATIENT
Start: 2022-10-25 | End: 2022-10-25

## 2022-10-25 RX ORDER — FENTANYL CITRATE 50 UG/ML
25 INJECTION, SOLUTION INTRAMUSCULAR; INTRAVENOUS
Status: DISCONTINUED | OUTPATIENT
Start: 2022-10-25 | End: 2022-10-25 | Stop reason: HOSPADM

## 2022-10-25 RX ORDER — PREDNISOLONE ACETATE 10 MG/ML
2 SUSPENSION/ DROPS OPHTHALMIC 2 TIMES DAILY
Status: DISCONTINUED | OUTPATIENT
Start: 2022-10-25 | End: 2022-10-25 | Stop reason: HOSPADM

## 2022-10-25 RX ADMIN — TROPICAMIDE 1 DROP: 10 SOLUTION/ DROPS OPHTHALMIC at 08:29

## 2022-10-25 RX ADMIN — PROPARACAINE HYDROCHLORIDE 1 DROP: 5 SOLUTION/ DROPS OPHTHALMIC at 08:06

## 2022-10-25 RX ADMIN — SODIUM CHLORIDE 25 ML/HR: 9 INJECTION, SOLUTION INTRAVENOUS at 08:06

## 2022-10-25 RX ADMIN — PHENYLEPHRINE HYDROCHLORIDE 1 DROP: 25 SOLUTION/ DROPS OPHTHALMIC at 08:24

## 2022-10-25 RX ADMIN — CYCLOPENTOLATE HYDROCHLORIDE 1 DROP: 20 SOLUTION/ DROPS OPHTHALMIC at 08:17

## 2022-10-25 RX ADMIN — MIDAZOLAM HYDROCHLORIDE 0.5 MG: 1 INJECTION, SOLUTION INTRAMUSCULAR; INTRAVENOUS at 08:55

## 2022-10-25 RX ADMIN — SODIUM CHLORIDE: 0.9 INJECTION, SOLUTION INTRAVENOUS at 08:53

## 2022-10-25 RX ADMIN — MIDAZOLAM HYDROCHLORIDE 0.5 MG: 1 INJECTION, SOLUTION INTRAMUSCULAR; INTRAVENOUS at 09:00

## 2022-10-25 RX ADMIN — CYCLOPENTOLATE HYDROCHLORIDE 1 DROP: 20 SOLUTION/ DROPS OPHTHALMIC at 08:06

## 2022-10-25 RX ADMIN — FENTANYL CITRATE 25 MCG: 50 INJECTION, SOLUTION INTRAMUSCULAR; INTRAVENOUS at 09:32

## 2022-10-25 RX ADMIN — TROPICAMIDE 1 DROP: 10 SOLUTION/ DROPS OPHTHALMIC at 08:17

## 2022-10-25 RX ADMIN — MIDAZOLAM HYDROCHLORIDE 1 MG: 1 INJECTION, SOLUTION INTRAMUSCULAR; INTRAVENOUS at 08:58

## 2022-10-25 RX ADMIN — TROPICAMIDE 1 DROP: 10 SOLUTION/ DROPS OPHTHALMIC at 08:06

## 2022-10-25 RX ADMIN — PHENYLEPHRINE HYDROCHLORIDE 1 DROP: 25 SOLUTION/ DROPS OPHTHALMIC at 08:30

## 2022-10-25 RX ADMIN — CYCLOPENTOLATE HYDROCHLORIDE 1 DROP: 20 SOLUTION/ DROPS OPHTHALMIC at 08:24

## 2022-10-25 RX ADMIN — PHENYLEPHRINE HYDROCHLORIDE 1 DROP: 25 SOLUTION/ DROPS OPHTHALMIC at 08:06

## 2022-10-25 RX ADMIN — PROPARACAINE HYDROCHLORIDE 1 DROP: 5 SOLUTION/ DROPS OPHTHALMIC at 08:29

## 2022-10-25 RX ADMIN — CYCLOPENTOLATE HYDROCHLORIDE 1 DROP: 20 SOLUTION/ DROPS OPHTHALMIC at 08:29

## 2022-10-25 RX ADMIN — PHENYLEPHRINE HYDROCHLORIDE 1 DROP: 25 SOLUTION/ DROPS OPHTHALMIC at 08:17

## 2022-10-25 RX ADMIN — TROPICAMIDE 1 DROP: 10 SOLUTION/ DROPS OPHTHALMIC at 08:24

## 2022-10-25 RX ADMIN — FENTANYL CITRATE 12.5 MCG: 50 INJECTION, SOLUTION INTRAMUSCULAR; INTRAVENOUS at 09:20

## 2022-10-25 NOTE — OP NOTES
Operative Note    Patient: Abdelrahman Holden  YOB: 1955  MRN: [id number]    Date of Procedure: 10/25/2022     Preop Diagnosis: VISUALLY SIGNIFICANT CATARACT RIGHT EYE    Postop Diagnosis: VISUALLY SIGNIFICANT CATARACT RIGHT EYE     Procedure/Surgery: Procedure(s):  RIGHT EYE PHACOEMULSIFICATION WITH INTRAOCULAR LENS IMPLANTATION    Surgeon(s) and Role:     Carlos Swanson MD - Primary    Anesthesia: MAC     Blood Loss: None    Complications: None    Speciman Removed: * No specimens in log *     PHACO TIME:   01:04.1 seconds at an average power of 4%. CDE 3.22    Implants:   Implant Name Type Inv. Item Serial No.  Lot No. LRB No. Used Action   ACRYSOF ASPHERIC UV ABSORBING IOL, [id number] 23.0D Other  [id number]  N/A Right 1 Implanted       Procedure: The patient was consented. The operative eye was confirmed and marked in the preoperative holding area. Dilation was confirmed and the patient was brought into the operating room. The eye was prepped and draped in the typical aseptic fashion. Lid speculum was placed. Paracentesis was made and preservative-free lidocaine was injected into the anterior chamber followed by viscoelastic (Viscoat) to fill the anterior chamber. A triplanar self-sealing temporal clear cornea incision was made with a 2.8 mm keratome. A continuous curvilinear capsulorrhexis was initiated with a cystotome and completed with Utrata forceps. Hydrodissection and hydrodelination were performed with confirmed free rotation of the nucleus. Phacoemulsification was peformed in a divide and conquer fashion followed by cortical removal with irrigation/aspiration as well as with bimanual I/A through an inferonasal paracentesis. Capsular polish was performed. The capsular bag was reinflated with viscoelastic (Healon). The lens type and power was confirmed and the lens injected into the capsular bag.   Residual viscoelastic was removed and the eye was formed with BSS. Stromal hydration was performed. The wound and paracenteses were watertight at the end of the case. The lens was well centered and the pupil was round at the end of the case. The lid speculum was removed. Postoperative eyedrops of delvin/poly/dex, Pred Forte and erythromycin ointment were instilled in the eye. An eye patch and shield was placed over the eye. The patient tolerated the surgery well and was taken to the postoperative holding area in a stable condition.     Lia Lincoln MD    10/25/2022  9:38 AM

## 2022-10-25 NOTE — ANESTHESIA POSTPROCEDURE EVALUATION
Procedure(s):  RIGHT EYE PHACOEMULSIFICATION WITH INTRAOCULAR LENS IMPLANTATION.     MAC    Anesthesia Post Evaluation      Multimodal analgesia: multimodal analgesia used between 6 hours prior to anesthesia start to PACU discharge  Patient location during evaluation: PACU  Patient participation: complete - patient participated  Level of consciousness: awake and alert  Pain score: 0  Airway patency: patent  Anesthetic complications: no  Cardiovascular status: acceptable  Respiratory status: acceptable  Hydration status: acceptable  Post anesthesia nausea and vomiting:  none  Final Post Anesthesia Temperature Assessment:  Normothermia (36.0-37.5 degrees C)      INITIAL Post-op Vital signs:   Vitals Value Taken Time   /55 10/25/22 1010   Temp 36.1 °C (97 °F) 10/25/22 1000   Pulse 65 10/25/22 1010   Resp 15 10/25/22 1010   SpO2 97 % 10/25/22 1010

## 2022-10-25 NOTE — DISCHARGE INSTRUCTIONS
CATARACT POST-OP INSTRUCTIONS    DAY OF SURGERY:  Remove patch  2 hours after you get home & start drops      1. Antibiotic - TAN TOP (Ofloxacin)                  1 drop in operated eye every 2 hours while awake                    2. Steroid - PINK OR WHITE TOP (Prednisolone acetate)                  SHAKE WELL BEFORE USING (10-15X)                  1 drop in operated eye every 2 hours while awake.                                                                        ________________________________________________________________________    STARTING ON THE DAY AFTER SURGERY:     1. Antibiotic - TAN TOP (Ofloxacin)                 1 drop in operated eye 4x per day for 1 week then STOP                   2. Steroid - PINK OR WHITE TOP (Prednisolone acetate)                  SHAKE WELL BEFORE USING (10-15X)                  1 drop in operated eye every 2 hours while awake x  1  week, then                  1 drop in operated eye 4x per day x  1  week then,                  1 drop in operated eye 3x per day x  1  week then,                  1 drop in operated eye 2x per day x  1  week then,                  1 drop in operated eye 1x per day x  1  week then,                                                              SPACE OUT ALL EYE DROPS BY 5 MINUTES SO YOU DON'T [address] OUT                ONE MEDICATION WITH THE OTHER ONE.      EYE CARE:    1.  DO NOT RUB YOUR EYE!  2.  Wear eye shield when sleeping for 1st week after surgery  3. No heavy lifting (over 15 pounds) or bending below the waist or straining (hard coughing/sneezing/bowel movements - take stool softener if needed)  for 5 days after surgery. 4.  Do not get dirty water in your surgery eye. You can shower if you cover and close your surgery eye.  5.  No makeup for 5 days after surgery on or around the surgery eye. No gardening or swimming for 3 weeks. Wear safety glasses whenever dirt may get in your eyes.   6.  Use all other eye medications you were on before surgery unless instructed by your eye doctor not to do so.  7.  Wash your hands before touching your eye or your eye drops. Do not contaminate the tips of the bottles. 8.  Call immediately for severe pain, worse redness, decreased vision, new flashing lights or floaters. A scratchy sensation is normal and should get better as your eye heals. 9.  Please call with any questions about your postop care. You can reach Dr. Cash Garcia at phone number:  [phone] (office) or [phone] (cell) in an emergency. DO NOT TAKE SLEEPING MEDICATIONS OR ANTIANXIETY MEDICATIONS WHILE TAKING NARCOTIC PAIN MEDICATIONS,  ESPECIALLY THE NIGHT OF ANESTHESIA. CPAP PATIENTS BE SURE TO WEAR MACHINE WHENEVER NAPPING OR SLEEPING. DISCHARGE SUMMARY from Nurse    The following personal items collected during your admission are returned to you:   Dental Appliance: Dental Appliances: Uppers, With patient, Lowers  Vision: Visual Aid: Glasses (in PACU)  Hearing Aid:    Jewelry: Jewelry: Ring, With patient  Clothing: Clothing: With patient  Other Valuables: Other Valuables: Eyeglasses (Glasses in PACU)  Valuables sent to safe:        PATIENT INSTRUCTIONS:    After general anesthesia or intravenous sedation, for 24 hours or while taking prescription Narcotics:  Someone should be with you for the next 24 hours. For your own safety, a responsible adult must drive you home. Limit your activities  Recommended activity: Rest today, up with assistance today. Do not climb stairs or shower unattended for the next 24 hours. Please start with a soft bland diet and advance as tolerated (no nausea) to regular diet. Do not drive and operate hazardous machinery  Do not make important personal or business decisions  Do  not drink alcoholic beverages  If you have not urinated within 8 hours after discharge, please contact your surgeon on call.     Report the following to your surgeon:  Excessive pain, swelling, redness or odor of or around the surgical area  Temperature over 100.5  Nausea and vomiting lasting longer than 4 hours or if unable to take medications    You will receive a Post Operative Call from one of the Recovery Room Nurses on the day after your surgery to check on you. It is very important for us to know how you are recovering after your surgery. If you have an issue or need to speak with someone, please call your surgeon, do not wait for the post operative call. You may receive an e-mail or letter in the mail from [employer] regarding your experience with us in the Ambulatory Surgery Unit. Your feedback is valuable to us and we appreciate your participation in the survey. .  If the above instructions are not adequate or you are having problems after your surgery, call the physician at his office number. We wish youre a speedy recovery ? What to do at Home:      *  Please give a list of your current medications to your Primary Care Provider. *  Please update this list whenever your medications are discontinued, doses are      changed, or new medications (including over-the-counter products) are added. *  Please carry medication information at all times in case of emergency situations. If you have not received your influenza and/or pneumococcal vaccine, please follow up with your primary care physician. The discharge information has been reviewed with the patient and caregiver. The patient and caregiver verbalized understanding. TO PREVENT AN INFECTION      [address] YOUR HANDS    To prevent infection, good handwashing is the most important thing you or your caregiver can do. Wash your hands with soap and water or use the hand  we gave you before you touch any wounds. USE CLEAN SHEETS    Use freshly cleaned sheets on your bed after surgery. To keep the surgery site clean, do not allow pets to sleep with you while your wound is still healing.      STOP SMOKING    Stop smoking, or at least cut back on smoking    Smoking slows your healing. CONTROL YOUR BLOOD SUGAR    High blood sugars slow wound healing. If you are diabetic, control your blood sugar levels before and after your surgery.

## 2022-10-25 NOTE — PERIOP NOTES
Feliberto Stafford  [birth year]  [id number]    Situation:  Verbal report given from: KIMBERLY Pabon CRNA, RN  Procedure: Procedure(s):  RIGHT EYE PHACOEMULSIFICATION WITH INTRAOCULAR LENS IMPLANTATION    Background:    Preoperative diagnosis: VISUALLY SIGNIFICANT CATARACT RIGHT EYE    Postoperative diagnosis: VISUALLY SIGNIFICANT CATARACT RIGHT EYE    :  Dr. Isaac Baca    Assistant(s): Circ-1: Leeanna Carvajal RN  Scrub Tech-1: Peter Bowers RT    Specimens: * No specimens in log *    Assessment:  Intra-procedure medications         Anesthesia gave intra-procedure sedation and medications, see anesthesia flow sheet     Intravenous fluids: LR@ KVO     Vital signs stable       Recommendation:

## 2022-10-25 NOTE — PERIOP NOTES
Patient: Mónica Pantoja MRN: [id number]  SSN: [id number]   YOB: 1955  Age: 75 y.o. Sex: female     Patient is status post Procedure(s):  RIGHT EYE PHACOEMULSIFICATION WITH INTRAOCULAR LENS IMPLANTATION. Surgeon(s) and Role:     Moriah Mercado MD - Primary    Local/Dose/Irrigation:  SEE MAR                  Peripheral IV 10/25/22 Left;Posterior Forearm (Active)   Site Assessment Clean, dry, & intact 10/25/22 0802   Phlebitis Assessment 0 10/25/22 0802   Infiltration Assessment 0 10/25/22 0802   Dressing Status Clean, dry, & intact 10/25/22 0802   Dressing Type Transparent 10/25/22 0802   Hub Color/Line Status Blue; Infusing 10/25/22 0802                           Dressing/Packing:  Incision 10/25/22 Eye Right-Dressing/Treatment: Eye shield;Tape/Soft cloth adhesive tape; Eye pad (SECURED WITH TAPE BY MD.) (10/25/22 0900)

## 2022-10-25 NOTE — ANESTHESIA PREPROCEDURE EVALUATION
Relevant Problems   RESPIRATORY SYSTEM   (+) Pneumonia due to COVID-19 virus      CARDIOVASCULAR   (+) HTN (hypertension)      GASTROINTESTINAL   (+) GERD (gastroesophageal reflux disease)      ENDOCRINE   (+) Type II diabetes mellitus (HCC)       Anesthetic History   No history of anesthetic complications            Review of Systems / Medical History  Patient summary reviewed, nursing notes reviewed and pertinent labs reviewed    Pulmonary    COPD    Sleep apnea: CPAP  Smoker         Neuro/Psych   Within defined limits           Cardiovascular    Hypertension          Past MI (2010), CAD and cardiac stents (2010)    Exercise tolerance: <4 METS  Comments: 03/21 Cath: Non-obstructive CAD with patent RCA stent    06/16 ECHO= EF 55-60%, no signif valvular abnormalities       GI/Hepatic/Renal     GERD: well controlled           Endo/Other    Diabetes: type 2    Cancer     Other Findings   Comments: Cataracts           Physical Exam    Airway  Mallampati: I  TM Distance: < 4 cm  Neck ROM: decreased range of motion, short neck   Mouth opening: Normal     Cardiovascular    Rhythm: regular  Rate: normal         Dental    Dentition: Full lower dentures and Full upper dentures     Pulmonary  Breath sounds clear to auscultation               Abdominal  GI exam deferred       Other Findings            Anesthetic Plan    ASA: 3  Anesthesia type: MAC          Induction: Intravenous  Anesthetic plan and risks discussed with: Patient      Took BB yesterday at 2300.  preop glucose

## 2022-10-25 NOTE — PERIOP NOTES
Blood sugar checked by fingerstick, result is 109. VSS  Pt denies any pain. Pt tolerating liquids. 1008--D/c instructions reviewed, all questions answered. Reviewed when to call the doctor,diet and activity. Reviewed no eye drops today in R eye, and to leave shield in place. Also reviewed about hand hygiene. 1020-Transported via w/c to awaiting transportation.

## 2022-10-25 NOTE — PERIOP NOTES
Permission received to review discharge instructions and discuss private health information with , Vallarie Buerger. Patient states that family/friend will be with them for at least 24 hours following today's procedure.

## 2025-03-30 ENCOUNTER — APPOINTMENT (OUTPATIENT)
Facility: HOSPITAL | Age: 70
DRG: 641 | End: 2025-03-30
Payer: MEDICARE

## 2025-03-30 ENCOUNTER — HOSPITAL ENCOUNTER (INPATIENT)
Facility: HOSPITAL | Age: 70
LOS: 2 days | Discharge: HOME | DRG: 641 | End: 2025-04-01
Attending: EMERGENCY MEDICINE | Admitting: STUDENT IN AN ORGANIZED HEALTH CARE EDUCATION/TRAINING PROGRAM
Payer: MEDICARE

## 2025-03-30 DIAGNOSIS — K29.00 ACUTE GASTRITIS WITHOUT HEMORRHAGE, UNSPECIFIED GASTRITIS TYPE: ICD-10-CM

## 2025-03-30 DIAGNOSIS — E80.6 HYPERBILIRUBINEMIA: ICD-10-CM

## 2025-03-30 DIAGNOSIS — R00.0 TACHYCARDIA: ICD-10-CM

## 2025-03-30 DIAGNOSIS — R11.2 NAUSEA AND VOMITING, UNSPECIFIED VOMITING TYPE: Primary | ICD-10-CM

## 2025-03-30 DIAGNOSIS — R19.7 DIARRHEA, UNSPECIFIED TYPE: ICD-10-CM

## 2025-03-30 DIAGNOSIS — N39.0 URINARY TRACT INFECTION WITH HEMATURIA, SITE UNSPECIFIED: ICD-10-CM

## 2025-03-30 DIAGNOSIS — R74.01 TRANSAMINITIS: ICD-10-CM

## 2025-03-30 DIAGNOSIS — N17.9 AKI (ACUTE KIDNEY INJURY): ICD-10-CM

## 2025-03-30 DIAGNOSIS — E87.20 LACTIC ACIDOSIS: ICD-10-CM

## 2025-03-30 DIAGNOSIS — I48.91 ATRIAL FIBRILLATION, UNSPECIFIED TYPE (HCC): ICD-10-CM

## 2025-03-30 DIAGNOSIS — R31.9 URINARY TRACT INFECTION WITH HEMATURIA, SITE UNSPECIFIED: ICD-10-CM

## 2025-03-30 LAB
ALBUMIN SERPL-MCNC: 1.9 G/DL (ref 3.5–5)
ALBUMIN SERPL-MCNC: 2.8 G/DL (ref 3.5–5)
ALBUMIN/GLOB SERPL: 0.6 (ref 1.1–2.2)
ALBUMIN/GLOB SERPL: 0.7 (ref 1.1–2.2)
ALP SERPL-CCNC: 192 U/L (ref 45–117)
ALP SERPL-CCNC: 282 U/L (ref 45–117)
ALT SERPL-CCNC: 116 U/L (ref 12–78)
ALT SERPL-CCNC: 82 U/L (ref 12–78)
ANION GAP SERPL CALC-SCNC: 6 MMOL/L (ref 2–12)
ANION GAP SERPL CALC-SCNC: 8 MMOL/L (ref 2–12)
APPEARANCE UR: ABNORMAL
AST SERPL-CCNC: 51 U/L (ref 15–37)
AST SERPL-CCNC: 74 U/L (ref 15–37)
BACTERIA URNS QL MICRO: ABNORMAL /HPF
BASOPHILS # BLD: 0.07 K/UL (ref 0–0.1)
BASOPHILS NFR BLD: 0.8 % (ref 0–1)
BILIRUB SERPL-MCNC: 1.8 MG/DL (ref 0.2–1)
BILIRUB SERPL-MCNC: 2.8 MG/DL (ref 0.2–1)
BILIRUB UR QL CFM: NORMAL
BUN SERPL-MCNC: 11 MG/DL (ref 6–20)
BUN SERPL-MCNC: 14 MG/DL (ref 6–20)
BUN/CREAT SERPL: 10 (ref 12–20)
BUN/CREAT SERPL: 8 (ref 12–20)
CALCIUM SERPL-MCNC: 6.8 MG/DL (ref 8.5–10.1)
CALCIUM SERPL-MCNC: 9.6 MG/DL (ref 8.5–10.1)
CHLORIDE SERPL-SCNC: 100 MMOL/L (ref 97–108)
CHLORIDE SERPL-SCNC: 113 MMOL/L (ref 97–108)
CHOLEST SERPL-MCNC: 68 MG/DL
CO2 SERPL-SCNC: 21 MMOL/L (ref 21–32)
CO2 SERPL-SCNC: 24 MMOL/L (ref 21–32)
COLOR UR: ABNORMAL
COMMENT:: NORMAL
CREAT SERPL-MCNC: 1.12 MG/DL (ref 0.55–1.02)
CREAT SERPL-MCNC: 1.72 MG/DL (ref 0.55–1.02)
DIFFERENTIAL METHOD BLD: ABNORMAL
EOSINOPHIL # BLD: 0.26 K/UL (ref 0–0.4)
EOSINOPHIL NFR BLD: 2.9 % (ref 0–7)
EPITH CASTS URNS QL MICRO: ABNORMAL /LPF
ERYTHROCYTE [DISTWIDTH] IN BLOOD BY AUTOMATED COUNT: 15.6 % (ref 11.5–14.5)
EST. AVERAGE GLUCOSE BLD GHB EST-MCNC: 163 MG/DL
FLUAV RNA SPEC QL NAA+PROBE: NOT DETECTED
FLUBV RNA SPEC QL NAA+PROBE: NOT DETECTED
GLOBULIN SER CALC-MCNC: 2.8 G/DL (ref 2–4)
GLOBULIN SER CALC-MCNC: 5 G/DL (ref 2–4)
GLUCOSE SERPL-MCNC: 187 MG/DL (ref 65–100)
GLUCOSE SERPL-MCNC: 310 MG/DL (ref 65–100)
GLUCOSE UR STRIP.AUTO-MCNC: NEGATIVE MG/DL
GRAN CASTS URNS QL MICRO: ABNORMAL /LPF
HBA1C MFR BLD: 7.3 % (ref 4–5.6)
HCT VFR BLD AUTO: 50.1 % (ref 35–47)
HDLC SERPL-MCNC: 9 MG/DL
HDLC SERPL: 7.6 (ref 0–5)
HGB BLD-MCNC: 16.1 G/DL (ref 11.5–16)
HGB UR QL STRIP: ABNORMAL
IMM GRANULOCYTES # BLD AUTO: 0.03 K/UL (ref 0–0.04)
IMM GRANULOCYTES NFR BLD AUTO: 0.3 % (ref 0–0.5)
KETONES UR QL STRIP.AUTO: NEGATIVE MG/DL
LACTATE BLD-SCNC: 0.93 MMOL/L (ref 0.4–2)
LACTATE BLD-SCNC: 1.54 MMOL/L (ref 0.4–2)
LACTATE BLD-SCNC: 2.37 MMOL/L (ref 0.4–2)
LDLC SERPL CALC-MCNC: 25.8 MG/DL (ref 0–100)
LEUKOCYTE ESTERASE UR QL STRIP.AUTO: ABNORMAL
LIPASE SERPL-CCNC: 46 U/L (ref 13–75)
LYMPHOCYTES # BLD: 1.18 K/UL (ref 0.8–3.5)
LYMPHOCYTES NFR BLD: 13 % (ref 12–49)
MAGNESIUM SERPL-MCNC: 1.7 MG/DL (ref 1.6–2.4)
MCH RBC QN AUTO: 26.9 PG (ref 26–34)
MCHC RBC AUTO-ENTMCNC: 32.1 G/DL (ref 30–36.5)
MCV RBC AUTO: 83.8 FL (ref 80–99)
MONOCYTES # BLD: 0.84 K/UL (ref 0–1)
MONOCYTES NFR BLD: 9.2 % (ref 5–13)
NEUTS SEG # BLD: 6.71 K/UL (ref 1.8–8)
NEUTS SEG NFR BLD: 73.8 % (ref 32–75)
NITRITE UR QL STRIP.AUTO: POSITIVE
NRBC # BLD: 0 K/UL (ref 0–0.01)
NRBC BLD-RTO: 0 PER 100 WBC
PH UR STRIP: 5.5 (ref 5–8)
PLATELET # BLD AUTO: 241 K/UL (ref 150–400)
PMV BLD AUTO: 11.9 FL (ref 8.9–12.9)
POTASSIUM SERPL-SCNC: 3.2 MMOL/L (ref 3.5–5.1)
POTASSIUM SERPL-SCNC: 3.4 MMOL/L (ref 3.5–5.1)
PROT SERPL-MCNC: 4.7 G/DL (ref 6.4–8.2)
PROT SERPL-MCNC: 7.8 G/DL (ref 6.4–8.2)
PROT UR STRIP-MCNC: 300 MG/DL
RBC # BLD AUTO: 5.98 M/UL (ref 3.8–5.2)
RBC #/AREA URNS HPF: ABNORMAL /HPF (ref 0–5)
SARS-COV-2 RNA RESP QL NAA+PROBE: NOT DETECTED
SODIUM SERPL-SCNC: 132 MMOL/L (ref 136–145)
SODIUM SERPL-SCNC: 140 MMOL/L (ref 136–145)
SOURCE: NORMAL
SP GR UR REFRACTOMETRY: 1.01
SPECIMEN HOLD: NORMAL
T4 FREE SERPL-MCNC: 1.2 NG/DL (ref 0.8–1.5)
TRIGL SERPL-MCNC: 166 MG/DL
TROPONIN I SERPL HS-MCNC: 22 NG/L (ref 0–51)
TSH SERPL DL<=0.05 MIU/L-ACNC: 1.54 UIU/ML (ref 0.36–3.74)
URINE CULTURE IF INDICATED: ABNORMAL
UROBILINOGEN UR QL STRIP.AUTO: 1 EU/DL (ref 0.2–1)
VLDLC SERPL CALC-MCNC: 33.2 MG/DL
WBC # BLD AUTO: 9.1 K/UL (ref 3.6–11)
WBC URNS QL MICRO: ABNORMAL /HPF (ref 0–4)
YEAST BUDDING URNS QL: PRESENT

## 2025-03-30 PROCEDURE — 84439 ASSAY OF FREE THYROXINE: CPT

## 2025-03-30 PROCEDURE — 2060000000 HC ICU INTERMEDIATE R&B

## 2025-03-30 PROCEDURE — 87088 URINE BACTERIA CULTURE: CPT

## 2025-03-30 PROCEDURE — 76705 ECHO EXAM OF ABDOMEN: CPT

## 2025-03-30 PROCEDURE — 6370000000 HC RX 637 (ALT 250 FOR IP): Performed by: STUDENT IN AN ORGANIZED HEALTH CARE EDUCATION/TRAINING PROGRAM

## 2025-03-30 PROCEDURE — 2500000003 HC RX 250 WO HCPCS: Performed by: STUDENT IN AN ORGANIZED HEALTH CARE EDUCATION/TRAINING PROGRAM

## 2025-03-30 PROCEDURE — 36415 COLL VENOUS BLD VENIPUNCTURE: CPT

## 2025-03-30 PROCEDURE — 81001 URINALYSIS AUTO W/SCOPE: CPT

## 2025-03-30 PROCEDURE — 6360000002 HC RX W HCPCS: Performed by: STUDENT IN AN ORGANIZED HEALTH CARE EDUCATION/TRAINING PROGRAM

## 2025-03-30 PROCEDURE — 87186 SC STD MICRODIL/AGAR DIL: CPT

## 2025-03-30 PROCEDURE — 6360000004 HC RX CONTRAST MEDICATION

## 2025-03-30 PROCEDURE — 2580000003 HC RX 258

## 2025-03-30 PROCEDURE — 83735 ASSAY OF MAGNESIUM: CPT

## 2025-03-30 PROCEDURE — 93005 ELECTROCARDIOGRAM TRACING: CPT

## 2025-03-30 PROCEDURE — 74177 CT ABD & PELVIS W/CONTRAST: CPT

## 2025-03-30 PROCEDURE — 6360000002 HC RX W HCPCS

## 2025-03-30 PROCEDURE — 71045 X-RAY EXAM CHEST 1 VIEW: CPT

## 2025-03-30 PROCEDURE — 87340 HEPATITIS B SURFACE AG IA: CPT

## 2025-03-30 PROCEDURE — 84443 ASSAY THYROID STIM HORMONE: CPT

## 2025-03-30 PROCEDURE — 96374 THER/PROPH/DIAG INJ IV PUSH: CPT

## 2025-03-30 PROCEDURE — 87040 BLOOD CULTURE FOR BACTERIA: CPT

## 2025-03-30 PROCEDURE — 80053 COMPREHEN METABOLIC PANEL: CPT

## 2025-03-30 PROCEDURE — 86803 HEPATITIS C AB TEST: CPT

## 2025-03-30 PROCEDURE — 85025 COMPLETE CBC W/AUTO DIFF WBC: CPT

## 2025-03-30 PROCEDURE — 83605 ASSAY OF LACTIC ACID: CPT

## 2025-03-30 PROCEDURE — 2580000003 HC RX 258: Performed by: STUDENT IN AN ORGANIZED HEALTH CARE EDUCATION/TRAINING PROGRAM

## 2025-03-30 PROCEDURE — 6370000000 HC RX 637 (ALT 250 FOR IP)

## 2025-03-30 PROCEDURE — 83690 ASSAY OF LIPASE: CPT

## 2025-03-30 PROCEDURE — 87086 URINE CULTURE/COLONY COUNT: CPT

## 2025-03-30 PROCEDURE — 99285 EMERGENCY DEPT VISIT HI MDM: CPT

## 2025-03-30 PROCEDURE — 87636 SARSCOV2 & INF A&B AMP PRB: CPT

## 2025-03-30 PROCEDURE — 83036 HEMOGLOBIN GLYCOSYLATED A1C: CPT

## 2025-03-30 PROCEDURE — 80061 LIPID PANEL: CPT

## 2025-03-30 PROCEDURE — 84484 ASSAY OF TROPONIN QUANT: CPT

## 2025-03-30 PROCEDURE — 2500000003 HC RX 250 WO HCPCS

## 2025-03-30 RX ORDER — ACETAMINOPHEN 325 MG/1
650 TABLET ORAL EVERY 4 HOURS PRN
Status: DISCONTINUED | OUTPATIENT
Start: 2025-03-30 | End: 2025-03-30

## 2025-03-30 RX ORDER — TRAZODONE HYDROCHLORIDE 50 MG/1
50 TABLET ORAL NIGHTLY
Status: DISCONTINUED | OUTPATIENT
Start: 2025-03-30 | End: 2025-04-01 | Stop reason: HOSPADM

## 2025-03-30 RX ORDER — MIDODRINE HYDROCHLORIDE 5 MG/1
10 TABLET ORAL ONCE
Status: COMPLETED | OUTPATIENT
Start: 2025-03-30 | End: 2025-03-30

## 2025-03-30 RX ORDER — BUDESONIDE 0.5 MG/2ML
0.5 INHALANT ORAL
Status: DISCONTINUED | OUTPATIENT
Start: 2025-03-30 | End: 2025-03-30

## 2025-03-30 RX ORDER — SPIRONOLACTONE 25 MG/1
25 TABLET ORAL DAILY
Status: DISCONTINUED | OUTPATIENT
Start: 2025-03-31 | End: 2025-04-01 | Stop reason: HOSPADM

## 2025-03-30 RX ORDER — POTASSIUM CHLORIDE 7.45 MG/ML
10 INJECTION INTRAVENOUS PRN
Status: DISCONTINUED | OUTPATIENT
Start: 2025-03-30 | End: 2025-04-01 | Stop reason: HOSPADM

## 2025-03-30 RX ORDER — 0.9 % SODIUM CHLORIDE 0.9 %
1000 INTRAVENOUS SOLUTION INTRAVENOUS
Status: COMPLETED | OUTPATIENT
Start: 2025-03-30 | End: 2025-03-30

## 2025-03-30 RX ORDER — FAMOTIDINE 40 MG/1
20 TABLET, FILM COATED ORAL DAILY
Qty: 15 TABLET | Refills: 3 | Status: CANCELLED | OUTPATIENT
Start: 2025-03-30

## 2025-03-30 RX ORDER — ENOXAPARIN SODIUM 100 MG/ML
90 INJECTION SUBCUTANEOUS ONCE
Status: COMPLETED | OUTPATIENT
Start: 2025-03-30 | End: 2025-03-30

## 2025-03-30 RX ORDER — SODIUM CHLORIDE 0.9 % (FLUSH) 0.9 %
5-40 SYRINGE (ML) INJECTION EVERY 12 HOURS SCHEDULED
Status: DISCONTINUED | OUTPATIENT
Start: 2025-03-30 | End: 2025-04-01 | Stop reason: HOSPADM

## 2025-03-30 RX ORDER — ONDANSETRON 2 MG/ML
4 INJECTION INTRAMUSCULAR; INTRAVENOUS EVERY 4 HOURS PRN
Status: DISCONTINUED | OUTPATIENT
Start: 2025-03-30 | End: 2025-03-30

## 2025-03-30 RX ORDER — DULOXETIN HYDROCHLORIDE 30 MG/1
60 CAPSULE, DELAYED RELEASE ORAL DAILY
Status: DISCONTINUED | OUTPATIENT
Start: 2025-03-31 | End: 2025-04-01 | Stop reason: HOSPADM

## 2025-03-30 RX ORDER — SODIUM CHLORIDE 0.9 % (FLUSH) 0.9 %
5-40 SYRINGE (ML) INJECTION PRN
Status: DISCONTINUED | OUTPATIENT
Start: 2025-03-30 | End: 2025-04-01 | Stop reason: HOSPADM

## 2025-03-30 RX ORDER — POTASSIUM CHLORIDE 1500 MG/1
40 TABLET, EXTENDED RELEASE ORAL PRN
Status: DISCONTINUED | OUTPATIENT
Start: 2025-03-30 | End: 2025-04-01 | Stop reason: HOSPADM

## 2025-03-30 RX ORDER — SODIUM CHLORIDE 9 MG/ML
INJECTION, SOLUTION INTRAVENOUS PRN
Status: DISCONTINUED | OUTPATIENT
Start: 2025-03-30 | End: 2025-04-01 | Stop reason: HOSPADM

## 2025-03-30 RX ORDER — METOPROLOL TARTRATE 25 MG/1
25 TABLET, FILM COATED ORAL 2 TIMES DAILY
Status: DISCONTINUED | OUTPATIENT
Start: 2025-03-30 | End: 2025-04-01 | Stop reason: HOSPADM

## 2025-03-30 RX ORDER — FLUCONAZOLE 100 MG/1
150 TABLET ORAL ONCE
Status: COMPLETED | OUTPATIENT
Start: 2025-03-30 | End: 2025-03-30

## 2025-03-30 RX ORDER — SODIUM CHLORIDE 9 MG/ML
INJECTION, SOLUTION INTRAVENOUS CONTINUOUS
Status: DISCONTINUED | OUTPATIENT
Start: 2025-03-30 | End: 2025-03-31

## 2025-03-30 RX ORDER — DEXTROSE MONOHYDRATE 100 MG/ML
INJECTION, SOLUTION INTRAVENOUS CONTINUOUS PRN
Status: DISCONTINUED | OUTPATIENT
Start: 2025-03-30 | End: 2025-04-01 | Stop reason: HOSPADM

## 2025-03-30 RX ORDER — MAGNESIUM HYDROXIDE/ALUMINUM HYDROXICE/SIMETHICONE 120; 1200; 1200 MG/30ML; MG/30ML; MG/30ML
30 SUSPENSION ORAL ONCE
Status: COMPLETED | OUTPATIENT
Start: 2025-03-30 | End: 2025-03-30

## 2025-03-30 RX ORDER — ACETAMINOPHEN 650 MG/1
650 SUPPOSITORY RECTAL EVERY 6 HOURS PRN
Status: DISCONTINUED | OUTPATIENT
Start: 2025-03-30 | End: 2025-04-01 | Stop reason: HOSPADM

## 2025-03-30 RX ORDER — OXYBUTYNIN CHLORIDE 5 MG/1
15 TABLET, EXTENDED RELEASE ORAL NIGHTLY
Status: DISCONTINUED | OUTPATIENT
Start: 2025-03-30 | End: 2025-04-01 | Stop reason: HOSPADM

## 2025-03-30 RX ORDER — ARFORMOTEROL TARTRATE 15 UG/2ML
15 SOLUTION RESPIRATORY (INHALATION)
Status: DISCONTINUED | OUTPATIENT
Start: 2025-03-30 | End: 2025-04-01 | Stop reason: HOSPADM

## 2025-03-30 RX ORDER — POLYETHYLENE GLYCOL 3350 17 G/17G
17 POWDER, FOR SOLUTION ORAL DAILY PRN
Status: DISCONTINUED | OUTPATIENT
Start: 2025-03-30 | End: 2025-04-01 | Stop reason: HOSPADM

## 2025-03-30 RX ORDER — 0.9 % SODIUM CHLORIDE 0.9 %
500 INTRAVENOUS SOLUTION INTRAVENOUS ONCE
Status: COMPLETED | OUTPATIENT
Start: 2025-03-30 | End: 2025-03-30

## 2025-03-30 RX ORDER — ALBUTEROL SULFATE 0.83 MG/ML
2.5 SOLUTION RESPIRATORY (INHALATION) EVERY 6 HOURS PRN
Status: DISCONTINUED | OUTPATIENT
Start: 2025-03-30 | End: 2025-04-01 | Stop reason: HOSPADM

## 2025-03-30 RX ORDER — BUDESONIDE 0.5 MG/2ML
0.5 INHALANT ORAL
Status: DISCONTINUED | OUTPATIENT
Start: 2025-03-30 | End: 2025-04-01 | Stop reason: HOSPADM

## 2025-03-30 RX ORDER — GLUCAGON 1 MG/ML
1 KIT INJECTION PRN
Status: DISCONTINUED | OUTPATIENT
Start: 2025-03-30 | End: 2025-04-01 | Stop reason: HOSPADM

## 2025-03-30 RX ORDER — ONDANSETRON 2 MG/ML
4 INJECTION INTRAMUSCULAR; INTRAVENOUS EVERY 6 HOURS PRN
Status: DISCONTINUED | OUTPATIENT
Start: 2025-03-30 | End: 2025-04-01 | Stop reason: HOSPADM

## 2025-03-30 RX ORDER — ACETAMINOPHEN 325 MG/1
650 TABLET ORAL EVERY 6 HOURS PRN
Status: DISCONTINUED | OUTPATIENT
Start: 2025-03-30 | End: 2025-04-01 | Stop reason: HOSPADM

## 2025-03-30 RX ORDER — AMLODIPINE BESYLATE 5 MG/1
5 TABLET ORAL DAILY
Status: DISCONTINUED | OUTPATIENT
Start: 2025-03-31 | End: 2025-04-01 | Stop reason: HOSPADM

## 2025-03-30 RX ORDER — FLUCONAZOLE 100 MG/1
150 TABLET ORAL
Status: DISCONTINUED | OUTPATIENT
Start: 2025-04-06 | End: 2025-04-01 | Stop reason: HOSPADM

## 2025-03-30 RX ORDER — MAGNESIUM SULFATE IN WATER 40 MG/ML
2000 INJECTION, SOLUTION INTRAVENOUS PRN
Status: DISCONTINUED | OUTPATIENT
Start: 2025-03-30 | End: 2025-04-01 | Stop reason: HOSPADM

## 2025-03-30 RX ORDER — CEPHALEXIN 500 MG/1
500 CAPSULE ORAL 2 TIMES DAILY
Qty: 14 CAPSULE | Refills: 0 | Status: CANCELLED | OUTPATIENT
Start: 2025-03-30 | End: 2025-04-06

## 2025-03-30 RX ORDER — ONDANSETRON 4 MG/1
4 TABLET, ORALLY DISINTEGRATING ORAL EVERY 8 HOURS PRN
Status: DISCONTINUED | OUTPATIENT
Start: 2025-03-30 | End: 2025-04-01 | Stop reason: HOSPADM

## 2025-03-30 RX ORDER — ONDANSETRON 4 MG/1
4 TABLET, ORALLY DISINTEGRATING ORAL ONCE
Status: COMPLETED | OUTPATIENT
Start: 2025-03-30 | End: 2025-03-30

## 2025-03-30 RX ORDER — FLUCONAZOLE 150 MG/1
150 TABLET ORAL ONCE
Qty: 1 TABLET | Refills: 0 | Status: CANCELLED | OUTPATIENT
Start: 2025-03-30 | End: 2025-03-30

## 2025-03-30 RX ORDER — ASPIRIN 81 MG/1
81 TABLET, CHEWABLE ORAL DAILY
Status: DISCONTINUED | OUTPATIENT
Start: 2025-03-31 | End: 2025-04-01 | Stop reason: HOSPADM

## 2025-03-30 RX ORDER — ARFORMOTEROL TARTRATE 15 UG/2ML
15 SOLUTION RESPIRATORY (INHALATION)
Status: DISCONTINUED | OUTPATIENT
Start: 2025-03-30 | End: 2025-03-30

## 2025-03-30 RX ORDER — GABAPENTIN 300 MG/1
300 CAPSULE ORAL 3 TIMES DAILY
Status: DISCONTINUED | OUTPATIENT
Start: 2025-03-30 | End: 2025-04-01 | Stop reason: HOSPADM

## 2025-03-30 RX ORDER — IOPAMIDOL 755 MG/ML
100 INJECTION, SOLUTION INTRAVASCULAR
Status: COMPLETED | OUTPATIENT
Start: 2025-03-30 | End: 2025-03-30

## 2025-03-30 RX ORDER — ENOXAPARIN SODIUM 100 MG/ML
40 INJECTION SUBCUTANEOUS DAILY
Status: DISCONTINUED | OUTPATIENT
Start: 2025-03-30 | End: 2025-03-30

## 2025-03-30 RX ADMIN — SODIUM CHLORIDE 1000 ML: 9 INJECTION, SOLUTION INTRAVENOUS at 16:19

## 2025-03-30 RX ADMIN — AMIODARONE HYDROCHLORIDE 1 MG/MIN: 1.8 INJECTION, SOLUTION INTRAVENOUS at 23:41

## 2025-03-30 RX ADMIN — ONDANSETRON 4 MG: 4 TABLET, ORALLY DISINTEGRATING ORAL at 15:31

## 2025-03-30 RX ADMIN — SODIUM CHLORIDE 40 MG: 9 INJECTION, SOLUTION INTRAMUSCULAR; INTRAVENOUS; SUBCUTANEOUS at 16:18

## 2025-03-30 RX ADMIN — AMIODARONE HYDROCHLORIDE 150 MG: 1.5 INJECTION, SOLUTION INTRAVENOUS at 23:24

## 2025-03-30 RX ADMIN — SODIUM CHLORIDE 500 ML: 0.9 INJECTION, SOLUTION INTRAVENOUS at 21:51

## 2025-03-30 RX ADMIN — POTASSIUM BICARBONATE 40 MEQ: 782 TABLET, EFFERVESCENT ORAL at 14:47

## 2025-03-30 RX ADMIN — SODIUM CHLORIDE 1000 ML: 9 INJECTION, SOLUTION INTRAVENOUS at 13:33

## 2025-03-30 RX ADMIN — MIDODRINE HYDROCHLORIDE 10 MG: 5 TABLET ORAL at 21:49

## 2025-03-30 RX ADMIN — ALUMINUM HYDROXIDE, MAGNESIUM HYDROXIDE, AND SIMETHICONE 30 ML: 200; 200; 20 SUSPENSION ORAL at 16:18

## 2025-03-30 RX ADMIN — SODIUM CHLORIDE: 0.9 INJECTION, SOLUTION INTRAVENOUS at 23:26

## 2025-03-30 RX ADMIN — ENOXAPARIN SODIUM 90 MG: 100 INJECTION SUBCUTANEOUS at 18:38

## 2025-03-30 RX ADMIN — SODIUM CHLORIDE 2.5 MG/HR: 900 INJECTION, SOLUTION INTRAVENOUS at 17:43

## 2025-03-30 RX ADMIN — IOPAMIDOL 100 ML: 755 INJECTION, SOLUTION INTRAVENOUS at 14:42

## 2025-03-30 RX ADMIN — METOPROLOL TARTRATE 25 MG: 25 TABLET, FILM COATED ORAL at 21:49

## 2025-03-30 RX ADMIN — WATER 1000 MG: 1 INJECTION INTRAMUSCULAR; INTRAVENOUS; SUBCUTANEOUS at 17:46

## 2025-03-30 RX ADMIN — FLUCONAZOLE 150 MG: 100 TABLET ORAL at 16:18

## 2025-03-30 RX ADMIN — SODIUM CHLORIDE, PRESERVATIVE FREE 10 ML: 5 INJECTION INTRAVENOUS at 21:51

## 2025-03-30 RX ADMIN — GABAPENTIN 300 MG: 300 CAPSULE ORAL at 21:49

## 2025-03-30 RX ADMIN — TRAZODONE HYDROCHLORIDE 50 MG: 50 TABLET ORAL at 21:49

## 2025-03-30 RX ADMIN — SODIUM CHLORIDE: 0.9 INJECTION, SOLUTION INTRAVENOUS at 17:45

## 2025-03-30 RX ADMIN — OXYBUTYNIN CHLORIDE 15 MG: 5 TABLET, EXTENDED RELEASE ORAL at 23:42

## 2025-03-30 ASSESSMENT — LIFESTYLE VARIABLES
HOW MANY STANDARD DRINKS CONTAINING ALCOHOL DO YOU HAVE ON A TYPICAL DAY: PATIENT DECLINED
HOW OFTEN DO YOU HAVE A DRINK CONTAINING ALCOHOL: PATIENT DECLINED

## 2025-03-30 ASSESSMENT — PAIN SCALES - GENERAL: PAINLEVEL_OUTOF10: 0

## 2025-03-30 NOTE — PROGRESS NOTES
Advance Care Planning Note      NAME: Rabia Herrera   :  1955   MRN:  [id number]     Date/Time:  3/30/2025 6:19 PM    Active Diagnoses:  New onset A-fib-with rapid ventricular rate  Likely in the background of acute dehydration from nausea and vomiting  [id number] score 4    These active diagnoses are of sufficient risk that focused discussion on advance care planning is indicated in order to allow the patient to thoughtfully consider personal goals of care, and if situations arise that prevent the ability to personally give input, to ensure appropriate representation of their personal desires for different levels and aggressiveness of care.     Discussion:   Code status addressed and wants to be a Full Code.  Patient wants central line and vasopressors if needed. Patient would also want a feeding tube, if needed, for nutritional support.  Patient  would like to assign  Gm  as the surrogate decision maker.    Persons present and participating in discussion: Rabia Herrera, Maggie Vo MD,       Time Spent:   Total time spent face-to-face in education and discussion:   15  minutes.         Maggie Vo MD   Hospitalist

## 2025-03-30 NOTE — H&P
Hospitalist Admission Note    NAME:   Rabia Herrera   : 1955   MRN: [id number]     Date/Time: 3/30/2025 5:34 PM    Patient PCP: Saurav Harkins MD    ______________________________________________________________________  Given the patient's current clinical presentation, I have a high level of concern for decompensation if discharged from the emergency department.  Complex decision making was performed, which includes reviewing the patient's available past medical records, laboratory results, and x-ray films.       My assessment of this patient's clinical condition and my plan of care is as follows.    Assessment / Plan:  New onset A-fib-with rapid ventricular rate  Likely in the background of acute dehydration from nausea and vomiting  [id number] score 4  Patient admitted to intermediate care with telemetry  EKG done on 2025-heart rate 122 bpm A-fib with RVR left axis deviation  Troponin 22  K - 3.4  Patient was started on IV diltiazem in the ED-continue the same  Continue telemetry monitoring  Cardiology consulted  May need echo  Will give a dose of enoxaparin-further anticoagulation based on cardiology discretion  Check magnesium-keep potassium and magnesium optimal, K more than equal to 4, magnesium more than equal to 2    Acute onset nausea and vomiting  Possibly gastroenteritis  Likely dehydration due to above  Hypokalemia  Near syncope due to above  CT abdomen and pelvis with and without IV contrast done on 3/30/2025-gastritis, constipation  Patient received fluid resuscitation in the ED  Continue maintenance fluid  Check daily electrolytes and supplement as needed  If starts having diarrhea we will get a stool sample  Follow up blood culture     Possible UTI  Urinalysis - small blood , nitrite + , bacteria +, yeast + , granular cast   Will check urine culture   Patient received a dose of ceftriaxone in the ED will continue the same  Check procal     Elevated transaminases  Likely

## 2025-03-30 NOTE — DISCHARGE INSTRUCTIONS
HOSPITALIST DISCHARGE INSTRUCTIONS    NAME: Rabia Herrera   :  1955   MRN:  [id number]     Date/Time:  2025 10:08 AM    ADMIT DATE: 3/30/2025   DISCHARGE DATE: 2025         It is important that you take the medication exactly as they are prescribed.   Keep your medication in the bottles provided by the pharmacist and keep a list of the medication names, dosages, and times to be taken in your wallet.   Do not take other medications without consulting your doctor.       What to do at Home    Recommended diet:  cardiac diet    Recommended activity: activity as tolerated      If you have questions regarding the hospital related prescriptions or hospital related issues please call [employer] [facility]' office at . You can always direct your questions to your primary care doctor if you are unable to reach your hospital physician; your PCP works as an extension of your hospital doctor just like your hospital doctor is an extension of your PCP for your time at the hospital ([facility])    If you experience any of the following symptoms then please call your primary care physician or return to the emergency room if you cannot get hold of your doctor:    Fever, chills, nausea, vomiting, or persistent diarrhea  Worsening weakness or new problems with your speech or balance  Dark stools or visible blood in your stools  New Leg swelling or shortness of breath as these could be signs of a clot    Additional Instructions:      Take full course of antibiotics and then take Diflucan tablet when course is finished.  Continue with Zofran and Pepcid combined with home omeprazole for gastritis. Follow-up with GI doctor. Return to ER with acute worsening symptoms such as chest pain, shortness of breath, abdominal pain, or other acute complaints. Thank you for choosing [facility] to be a part of your care today.

## 2025-03-30 NOTE — ED PROVIDER NOTES
[facility] EMERGENCY DEPARTMENT  EMERGENCY DEPARTMENT ENCOUNTER       Pt Name: Rabia Herrera  MRN: [id number]  Birthdate 1955  Date of evaluation: 3/30/2025  Provider: Callie Ma PA-C   PCP: Saurav Harkins MD  Note Started: 1:15 PM EDT 3/30/25     CHIEF COMPLAINT       Chief Complaint   Patient presents with    Vomiting     Pt wheeled into triage with c/o not feeling well, to include NVD for several days. Pt concerned it began after consuming bad almond milk. Pt also reporting weakness beginning today.         HISTORY OF PRESENT ILLNESS: 1 or more elements      History From: Patient, History limited by: none     Rabia Herrera is a 70 y.o. female with a history of COPD, diabetes, GERD, and hypertension presenting for evaluation of nausea, vomiting, and diarrhea for the past several days with new onset generalized fatigue today.  Patient reports that she thinks she drinks about almond milk and that is causing her symptoms.  Denies any sick contacts.     Does report some dysuria for \"a while\" with some vague right flank pain \"all over that area\" and generalized pain in the abdomen described as intermittent \"squeezing pressure\".     Denies chest pain, shortness of breath, fevers, hematuria, or numbness, tingling, or weakness of the extremities.       Please See MDM for Additional Details of the HPI/PMH  Nursing Notes were all reviewed and agreed with or any disagreements were addressed in the HPI.     REVIEW OF SYSTEMS        Positives and Pertinent negatives as per HPI.    PAST HISTORY     Past Medical History:  Past Medical History:   Diagnosis Date    CAD (coronary artery disease)     Cancer (HCC)     breast cancer with right radical mastectomy 2008    Chronic obstructive pulmonary disease (HCC)     Diabetes (HCC)     GERD (gastroesophageal reflux disease)     Sleep apnea     uses CPAP at night       Past Surgical History:  Past Surgical History:   Procedure Laterality Date    BREAST

## 2025-03-31 ENCOUNTER — APPOINTMENT (OUTPATIENT)
Facility: HOSPITAL | Age: 70
DRG: 641 | End: 2025-03-31
Attending: STUDENT IN AN ORGANIZED HEALTH CARE EDUCATION/TRAINING PROGRAM
Payer: MEDICARE

## 2025-03-31 LAB
ALBUMIN SERPL-MCNC: 2.2 G/DL (ref 3.5–5)
ALBUMIN/GLOB SERPL: 0.6 (ref 1.1–2.2)
ALP SERPL-CCNC: 215 U/L (ref 45–117)
ALT SERPL-CCNC: 90 U/L (ref 12–78)
ANION GAP SERPL CALC-SCNC: 5 MMOL/L (ref 2–12)
AST SERPL-CCNC: 61 U/L (ref 15–37)
BASOPHILS # BLD: 0.07 K/UL (ref 0–0.1)
BASOPHILS NFR BLD: 0.9 % (ref 0–1)
BILIRUB SERPL-MCNC: 1.4 MG/DL (ref 0.2–1)
BUN SERPL-MCNC: 11 MG/DL (ref 6–20)
BUN/CREAT SERPL: 8 (ref 12–20)
CALCIUM SERPL-MCNC: 8.5 MG/DL (ref 8.5–10.1)
CHLORIDE SERPL-SCNC: 108 MMOL/L (ref 97–108)
CO2 SERPL-SCNC: 24 MMOL/L (ref 21–32)
CREAT SERPL-MCNC: 1.36 MG/DL (ref 0.55–1.02)
DIFFERENTIAL METHOD BLD: ABNORMAL
EOSINOPHIL # BLD: 0.32 K/UL (ref 0–0.4)
EOSINOPHIL NFR BLD: 4 % (ref 0–7)
ERYTHROCYTE [DISTWIDTH] IN BLOOD BY AUTOMATED COUNT: 15.5 % (ref 11.5–14.5)
GLOBULIN SER CALC-MCNC: 3.8 G/DL (ref 2–4)
GLUCOSE BLD STRIP.AUTO-MCNC: 115 MG/DL (ref 65–117)
GLUCOSE BLD STRIP.AUTO-MCNC: 128 MG/DL (ref 65–117)
GLUCOSE BLD STRIP.AUTO-MCNC: 160 MG/DL (ref 65–117)
GLUCOSE BLD STRIP.AUTO-MCNC: 213 MG/DL (ref 65–117)
GLUCOSE SERPL-MCNC: 217 MG/DL (ref 65–100)
HBV SURFACE AG SER QL: <0.1 INDEX
HBV SURFACE AG SER QL: NEGATIVE
HCT VFR BLD AUTO: 41.7 % (ref 35–47)
HCV AB SER IA-ACNC: 0.08 INDEX
HCV AB SERPL QL IA: NONREACTIVE
HGB BLD-MCNC: 13.1 G/DL (ref 11.5–16)
IMM GRANULOCYTES # BLD AUTO: 0.03 K/UL (ref 0–0.04)
IMM GRANULOCYTES NFR BLD AUTO: 0.4 % (ref 0–0.5)
LYMPHOCYTES # BLD: 1.65 K/UL (ref 0.8–3.5)
LYMPHOCYTES NFR BLD: 20.7 % (ref 12–49)
MCH RBC QN AUTO: 27 PG (ref 26–34)
MCHC RBC AUTO-ENTMCNC: 31.4 G/DL (ref 30–36.5)
MCV RBC AUTO: 85.8 FL (ref 80–99)
MONOCYTES # BLD: 0.98 K/UL (ref 0–1)
MONOCYTES NFR BLD: 12.3 % (ref 5–13)
NEUTS SEG # BLD: 4.94 K/UL (ref 1.8–8)
NEUTS SEG NFR BLD: 61.7 % (ref 32–75)
NRBC # BLD: 0 K/UL (ref 0–0.01)
NRBC BLD-RTO: 0 PER 100 WBC
PLATELET # BLD AUTO: 197 K/UL (ref 150–400)
PMV BLD AUTO: 11.9 FL (ref 8.9–12.9)
POTASSIUM SERPL-SCNC: 3.7 MMOL/L (ref 3.5–5.1)
PROT SERPL-MCNC: 6 G/DL (ref 6.4–8.2)
RBC # BLD AUTO: 4.86 M/UL (ref 3.8–5.2)
SERVICE CMNT-IMP: ABNORMAL
SERVICE CMNT-IMP: NORMAL
SODIUM SERPL-SCNC: 137 MMOL/L (ref 136–145)
TROPONIN I SERPL HS-MCNC: 16 NG/L (ref 0–51)
WBC # BLD AUTO: 8 K/UL (ref 3.6–11)

## 2025-03-31 PROCEDURE — 6360000002 HC RX W HCPCS: Performed by: STUDENT IN AN ORGANIZED HEALTH CARE EDUCATION/TRAINING PROGRAM

## 2025-03-31 PROCEDURE — 84484 ASSAY OF TROPONIN QUANT: CPT

## 2025-03-31 PROCEDURE — 80053 COMPREHEN METABOLIC PANEL: CPT

## 2025-03-31 PROCEDURE — 2580000003 HC RX 258

## 2025-03-31 PROCEDURE — 2060000000 HC ICU INTERMEDIATE R&B

## 2025-03-31 PROCEDURE — 6360000004 HC RX CONTRAST MEDICATION: Performed by: STUDENT IN AN ORGANIZED HEALTH CARE EDUCATION/TRAINING PROGRAM

## 2025-03-31 PROCEDURE — 6370000000 HC RX 637 (ALT 250 FOR IP): Performed by: INTERNAL MEDICINE

## 2025-03-31 PROCEDURE — 36415 COLL VENOUS BLD VENIPUNCTURE: CPT

## 2025-03-31 PROCEDURE — 2500000003 HC RX 250 WO HCPCS: Performed by: STUDENT IN AN ORGANIZED HEALTH CARE EDUCATION/TRAINING PROGRAM

## 2025-03-31 PROCEDURE — 2700000000 HC OXYGEN THERAPY PER DAY

## 2025-03-31 PROCEDURE — 94640 AIRWAY INHALATION TREATMENT: CPT

## 2025-03-31 PROCEDURE — 82962 GLUCOSE BLOOD TEST: CPT

## 2025-03-31 PROCEDURE — C8924 2D TTE W OR W/O FOL W/CON,FU: HCPCS

## 2025-03-31 PROCEDURE — 85025 COMPLETE CBC W/AUTO DIFF WBC: CPT

## 2025-03-31 PROCEDURE — 6370000000 HC RX 637 (ALT 250 FOR IP): Performed by: STUDENT IN AN ORGANIZED HEALTH CARE EDUCATION/TRAINING PROGRAM

## 2025-03-31 RX ORDER — GUAIFENESIN/DEXTROMETHORPHAN 100-10MG/5
5 SYRUP ORAL EVERY 4 HOURS PRN
Status: DISCONTINUED | OUTPATIENT
Start: 2025-03-31 | End: 2025-04-01 | Stop reason: HOSPADM

## 2025-03-31 RX ORDER — INSULIN LISPRO 100 [IU]/ML
0-16 INJECTION, SOLUTION INTRAVENOUS; SUBCUTANEOUS
Status: DISCONTINUED | OUTPATIENT
Start: 2025-03-31 | End: 2025-04-01 | Stop reason: HOSPADM

## 2025-03-31 RX ORDER — TRAZODONE HYDROCHLORIDE 50 MG/1
50 TABLET ORAL NIGHTLY
COMMUNITY

## 2025-03-31 RX ORDER — ROSUVASTATIN CALCIUM 40 MG/1
40 TABLET, COATED ORAL EVERY EVENING
COMMUNITY
End: 2025-04-01 | Stop reason: HOSPADM

## 2025-03-31 RX ORDER — DULOXETIN HYDROCHLORIDE 60 MG/1
60 CAPSULE, DELAYED RELEASE ORAL DAILY
COMMUNITY

## 2025-03-31 RX ORDER — DEXTROSE MONOHYDRATE 100 MG/ML
INJECTION, SOLUTION INTRAVENOUS CONTINUOUS PRN
Status: DISCONTINUED | OUTPATIENT
Start: 2025-03-31 | End: 2025-03-31 | Stop reason: SDUPTHER

## 2025-03-31 RX ORDER — OXYBUTYNIN CHLORIDE 15 MG/1
15 TABLET, EXTENDED RELEASE ORAL DAILY
COMMUNITY

## 2025-03-31 RX ORDER — MONTELUKAST SODIUM 10 MG/1
10 TABLET ORAL NIGHTLY
COMMUNITY

## 2025-03-31 RX ORDER — LACTOBACILLUS RHAMNOSUS GG 10B CELL
1 CAPSULE ORAL
Status: DISCONTINUED | OUTPATIENT
Start: 2025-04-01 | End: 2025-04-01 | Stop reason: HOSPADM

## 2025-03-31 RX ORDER — AMLODIPINE BESYLATE 5 MG/1
5 TABLET ORAL DAILY
Status: ON HOLD | COMMUNITY
End: 2025-04-01 | Stop reason: HOSPADM

## 2025-03-31 RX ADMIN — DULOXETINE HYDROCHLORIDE 60 MG: 30 CAPSULE, DELAYED RELEASE ORAL at 08:29

## 2025-03-31 RX ADMIN — GUAIFENESIN AND DEXTROMETHORPHAN 5 ML: 100; 10 SYRUP ORAL at 21:46

## 2025-03-31 RX ADMIN — AMIODARONE HYDROCHLORIDE 0.5 MG/MIN: 1.8 INJECTION, SOLUTION INTRAVENOUS at 04:52

## 2025-03-31 RX ADMIN — AMOXICILLIN AND CLAVULANATE POTASSIUM 1 TABLET: 875; 125 TABLET, FILM COATED ORAL at 21:32

## 2025-03-31 RX ADMIN — BUDESONIDE 500 MCG: 0.5 INHALANT RESPIRATORY (INHALATION) at 20:30

## 2025-03-31 RX ADMIN — INSULIN HUMAN 35 UNITS: 100 INJECTION, SOLUTION PARENTERAL at 08:30

## 2025-03-31 RX ADMIN — GUAIFENESIN AND DEXTROMETHORPHAN 5 ML: 100; 10 SYRUP ORAL at 14:25

## 2025-03-31 RX ADMIN — ARFORMOTEROL TARTRATE 15 MCG: 15 SOLUTION RESPIRATORY (INHALATION) at 07:30

## 2025-03-31 RX ADMIN — ARFORMOTEROL TARTRATE 15 MCG: 15 SOLUTION RESPIRATORY (INHALATION) at 20:30

## 2025-03-31 RX ADMIN — METOPROLOL TARTRATE 25 MG: 25 TABLET, FILM COATED ORAL at 20:48

## 2025-03-31 RX ADMIN — METOPROLOL TARTRATE 25 MG: 25 TABLET, FILM COATED ORAL at 08:29

## 2025-03-31 RX ADMIN — SULFUR HEXAFLUORIDE 2 ML: KIT at 09:51

## 2025-03-31 RX ADMIN — INSULIN HUMAN 35 UNITS: 100 INJECTION, SOLUTION PARENTERAL at 18:16

## 2025-03-31 RX ADMIN — SODIUM CHLORIDE: 0.9 INJECTION, SOLUTION INTRAVENOUS at 15:25

## 2025-03-31 RX ADMIN — GABAPENTIN 300 MG: 300 CAPSULE ORAL at 14:25

## 2025-03-31 RX ADMIN — RIVAROXABAN 15 MG: 15 TABLET, FILM COATED ORAL at 18:16

## 2025-03-31 RX ADMIN — GABAPENTIN 300 MG: 300 CAPSULE ORAL at 08:29

## 2025-03-31 RX ADMIN — SODIUM CHLORIDE: 0.9 INJECTION, SOLUTION INTRAVENOUS at 04:54

## 2025-03-31 RX ADMIN — ASPIRIN 81 MG: 81 TABLET, CHEWABLE ORAL at 08:29

## 2025-03-31 RX ADMIN — GABAPENTIN 300 MG: 300 CAPSULE ORAL at 20:48

## 2025-03-31 RX ADMIN — OXYBUTYNIN CHLORIDE 15 MG: 5 TABLET, EXTENDED RELEASE ORAL at 20:48

## 2025-03-31 RX ADMIN — WATER 1000 MG: 1 INJECTION INTRAMUSCULAR; INTRAVENOUS; SUBCUTANEOUS at 18:16

## 2025-03-31 RX ADMIN — TRAZODONE HYDROCHLORIDE 50 MG: 50 TABLET ORAL at 20:48

## 2025-03-31 RX ADMIN — BUDESONIDE 500 MCG: 0.5 INHALANT RESPIRATORY (INHALATION) at 07:30

## 2025-03-31 RX ADMIN — IPRATROPIUM BROMIDE 0.5 MG: 0.5 SOLUTION RESPIRATORY (INHALATION) at 20:30

## 2025-03-31 ASSESSMENT — PAIN DESCRIPTION - LOCATION: LOCATION: CHEST

## 2025-03-31 ASSESSMENT — PAIN DESCRIPTION - ORIENTATION: ORIENTATION: MID

## 2025-03-31 ASSESSMENT — PAIN SCALES - GENERAL
PAINLEVEL_OUTOF10: 0
PAINLEVEL_OUTOF10: 6
PAINLEVEL_OUTOF10: 0

## 2025-03-31 NOTE — PROGRESS NOTES
Pharmacy Medication History Note    The patient was interviewed regarding current PTA medication list, use and drug allergies;  patient present in room and obtained permission from patient to discuss drug regimen with visitor(s) present. The patient was questioned regarding use of any other inhalers, topical products, over the counter medications, herbal medications, vitamin products or ophthalmic/nasal/otic medication use.     Allergy Update: Codeine    Recommendations/Findings:   The following amendments were made to the patient's active medication list on file at [facility]:   1) Additions: oxybutynin, montelukast, duloxetine, amlodipine, trazodone    2) Deletions: atorvastatin, pravastatin    3) Changes: None      Pertinent Findings: See above    Clarified PTA med list with patient, fill history . PTA medication list was corrected to the following:     Prior to Admission Medications   Prescriptions Last Dose Informant   Budeson-Glycopyrrol-Formoterol (BREZTRI AEROSPHERE) 160-9-4.8 MCG/ACT AERO     Sig: Inhale 2 puffs into the lungs 2 times daily   DULoxetine (CYMBALTA) 60 MG extended release capsule     Sig: Take 1 capsule by mouth daily   albuterol sulfate HFA (PROVENTIL;VENTOLIN;PROAIR) 108 (90 Base) MCG/ACT inhaler     Sig: Inhale 1 puff into the lungs every 6 hours as needed   amLODIPine (NORVASC) 5 MG tablet     Sig: Take 1 tablet by mouth daily   aspirin 81 MG chewable tablet     Sig: Take by mouth daily   fluconazole (DIFLUCAN) 150 MG tablet     Sig: Take by mouth every 7 days   gabapentin (NEURONTIN) 300 MG capsule     Sig: Take by mouth 3 times daily.   metoprolol tartrate (LOPRESSOR) 25 MG tablet     Sig: Take by mouth 2 times daily   montelukast (SINGULAIR) 10 MG tablet     Sig: Take 1 tablet by mouth nightly   omeprazole (PRILOSEC) 20 MG delayed release capsule     Sig: Take by mouth daily   oxyBUTYnin (DITROPAN XL) 15 MG extended release tablet     Sig: Take 1 tablet by mouth daily   rosuvastatin

## 2025-03-31 NOTE — CONSULTS
Gastroenterology Attending Physician attestation statement and comments.  This patient was seen and examined by me in a face-to-face visit today. I reviewed the medical record including lab work, imaging and other provider notes. I confirmed the history as described above. I spoke to the patient, reviewed the medical record including lab work, imaging and other provider notes. I discussed this case in detail with HASMUKH Kelly. I formulated an  assessment of this patient and developed a treatment plan.   I agree with the above consultation note. I agree with the history, exam and assessment and plan as outlined in the note.  I would like to add the followinyo F w/ CAD, COPD, DM, Rodriguez's esophagus, and GERD, seen for elevated LFTS in setting of acute onset N/V, and abdominal, the latter three which have all resolved at this time.  She denies GIB, NSAIDs.  Drinks EtOH 5-10 drinks/d.  Last EGD/CLN in 3/2014 with finding of Rodriguez's esophagus, reflux esophagitis, hiatal hernia, normal colon.  Noted to be dehydrated and in Afib with RVR on admission and begun on Amiodarone gtt.  Unremarkable PE.  Labs with Hgb 16.1 that returns to baseline 13.1 with hydration, nl WBC, T.Bili 2.8, alk Phos 282, AST/ALT 74/116, all trending down now.  CT w/ contrast and US w/ constipation, gastric WT, hepatic steatosis, contracted GB w/o inflammation, nl CBD.   Suspect elevation related to hemoconcentration in setting of N/V and secondary hypotension with her Afib with RVR.  Cannot exclude contribution from metabolic syndrome/steatosis and moderate EtOH use.   No obstruction noted  Plan:  1) Encourage PO  2) She is overdue for EGD to f/u her noted Rodriguez's esophagus- this can be done as an OP at which time biopsies can be obtained of stomach as well  3) She is also overdue for her f/u screening colonoscopy  This was d/w pt and we will attempt seeing her as OP to facilitate this.  No further GI intervention planned as IP.  Will sign  51 ng/L   Bilirubin, Confirmatory    Collection Time: 03/30/25  1:30 PM   Result Value Ref Range    Bilirubin Confirmation, UA INDETERMINATE DUE TO COLOR INTERFERENCE     POC Lactic Acid    Collection Time: 03/30/25  1:36 PM   Result Value Ref Range    POC Lactic Acid 2.37 (HH) 0.40 - 2.00 mmol/L   EKG 12 Lead    Collection Time: 03/30/25  1:41 PM   Result Value Ref Range    Ventricular Rate 122 BPM    QRS Duration 68 ms    Q-T Interval 308 ms    QTc Calculation (Bazett) 438 ms    R Axis -62 degrees    T Axis 59 degrees    Diagnosis       Atrial fibrillation with rapid ventricular response  Left axis deviation  Inferior infarct , age undetermined  Anterolateral infarct (cited on or before 28-NOV-2020)  Abnormal ECG  When compared with ECG of 28-NOV-2020 17:11,  Atrial fibrillation has replaced Sinus rhythm  Inferior infarct is now present  Questionable change in initial forces of Anterolateral leads     POC Lactic Acid    Collection Time: 03/30/25  2:40 PM   Result Value Ref Range    POC Lactic Acid 1.54 0.40 - 2.00 mmol/L   Extra Tubes Hold    Collection Time: 03/30/25  3:30 PM   Result Value Ref Range    Specimen HOld Blood Cultures 1 & 2     Comment:        Add-on orders for these samples will be processed based on acceptable specimen integrity and analyte stability, which may vary by analyte.   Blood Culture 1    Collection Time: 03/30/25  3:30 PM    Specimen: Blood   Result Value Ref Range    Special Requests NO SPECIAL REQUESTS      Culture NO GROWTH AFTER 15 HOURS     Blood Culture 2    Collection Time: 03/30/25  3:30 PM    Specimen: Blood   Result Value Ref Range    Special Requests NO SPECIAL REQUESTS      Culture NO GROWTH AFTER 15 HOURS     TSH + Free T4 Panel    Collection Time: 03/30/25  5:50 PM   Result Value Ref Range    TSH, 3rd Generation 1.54 0.36 - 3.74 uIU/mL    T4 Free 1.2 0.8 - 1.5 NG/DL   Comprehensive Metabolic Panel    Collection Time: 03/30/25  6:33 PM   Result Value Ref Range    Sodium 140

## 2025-03-31 NOTE — ED NOTES
Perfect Serve sent to covering Samia:     Hello and evening! I am taking care of a boarder patient in the ED who is in here for new afib, ADARSH and gastritis. She is currently on a diltiazem gtt, and her BP decreased to 70/40s. I stopped the gtt for the time being. She recieved 2L NS here in the ED and is getting 75 ml/hr maintenance fluids as well. Do we want to try midodrine and recheck a POCT lactate?

## 2025-03-31 NOTE — PROGRESS NOTES
End of Shift Note    Bedside shift change report given to Marcie TOTH (oncoming nurse) by Ralph Corral RN (offgoing nurse).  Report included the following information SBAR, ED Summary, OR Summary, Intake/Output, MAR, Recent Results, Med Rec Status, Cardiac Rhythm AFib, Pre Procedure Checklist, and Quality Measures    Shift worked:  8961-4122     Shift summary and any significant changes:     Admiited yesterday with AFIB RVR-currenttly on amiadrone at 16.7mg/hr,blood pressures are holding on well so far       Concerns for physician to address:         Zone phone for oncoming shift:            Activity:  Level of Assistance: Independent  Number times ambulated in hallways past shift: 2  Number of times OOB to chair past shift: 2    Cardiac:   Cardiac Monitoring: Yes      Cardiac Rhythm: Atrial fib    Access:  Current line(s): PIV     Genitourinary:   Urinary Status: Voiding, Bathroom privileges    Respiratory:   O2 Device: Nasal cannula  Chronic home O2 use?: NO  Incentive spirometer at bedside: YES    GI:  Last BM (including prior to admit): 03/30/25  Current diet:  ADULT DIET; Regular; 4 carb choices (60 gm/meal)  Passing flatus: YES    Pain Management:   Patient states pain is manageable on current regimen: YES    Skin:  Jake Scale Score: 23  Interventions: Wound Offloading (Prevention Methods): Bed, pressure redistribution/air    Patient Safety:  Fall Risk: Nursing Judgement-Fall Risk High(Add Comments): Yes  Fall Risk Interventions  Nursing Judgement-Fall Risk High(Add Comments): Yes  Toilet Every 2 Hours-In Advance of Need: Yes  Hourly Visual Checks: In bed, Quiet  Fall Visual Posted: Fall cloth/blanket  Room Door Open: Yes  Alarm On: Bed, Chair, Personal  Patient Moved Closer to Nursing Station: No    Active Consults:   IP CONSULT TO CARDIOLOGY  IP CONSULT TO GI    Length of Stay:  Expected LOS: 2  Actual LOS: 1    Ralph Corral, RN

## 2025-03-31 NOTE — PROGRESS NOTES
4 Eyes Skin Assessment     NAME:  Rabia Herrera  YOB: 1955  MEDICAL RECORD NUMBER:  [id number]    The patient is being assessed for  Admission    I agree that at least one RN has performed a thorough Head to Toe Skin Assessment on the patient. ALL assessment sites listed below have been assessed.      Areas assessed by both nurses:    Head, Face, Ears, Shoulders, Back, Chest, Arms, Elbows, Hands, Sacrum. Buttock, Coccyx, Ischium, Legs. Feet and Heels, Under Medical Devices , and Other    Skin  tear R hand      Does the Patient have a Wound? Yes wound(s) were present on assessment. LDA wound assessment was Initiated and completed by RN       Jake Prevention initiated by RN: Yes  Wound Care Orders initiated by RN: Yes    Pressure Injury (Stage 3,4, Unstageable, DTI, NWPT, and Complex wounds) if present, place Wound referral order by RN under : Yes    New Ostomies, if present place, Ostomy referral order under : No     Nurse 1 eSignature: Electronically signed by Ralph Corral RN on 3/31/25 at 4:22 AM EDT    **SHARE this note so that the co-signing nurse can place an eSignature**    Nurse 2 eSignature: Electronically signed by Callie Skinner RN on 3/31/25 at 6:01 AM EDT

## 2025-03-31 NOTE — CONSULTS
[facility] EP/ ARRHYTHMIA/ CARDIOLOGY CONSULT      EP Cardiology consult requested by Chadwick Lanza MD for atrial fibrillation   PCP:  Saurav Harkins MD    Primary cardiologist: Dr Marshall     Electrophysiology Service  3/31/2025     Assessment:   Atrial fibrillation with rapid ventricular response   Rate controlled  New onset  Previously not on OAC now on Eliquis  CAD  Status post PCI to RCA  COPD  DM2  HTN  HLD  Dehydration    Plan:   Agree with Xarelto 15 mg daily  Continue po metoprolol  Echocardiogram   Discussed with patient rhythm treatment strategies. Patient is interested in an atrial fibrillation ablation. Risks benefits and alternatives discussed with patient at length. We will set this up as outpatient procedure. CHADS_VASC is 4. Will need indefinite anticoagulation.        HPI:  Rabia Herrera is a 70 y.o. female with history of:  Problems:  1. CAD s/p MI 1/30/10 with subsequent PCI/Keavy HE in the RCA - no critical left coronary disease by description. Echo June 2016 EF 55-60%, no significant valvular abnormalities.    - Thoracic CTA 6/21/17: widely patent stent in the proximal RCA, LM patent, CX had some calcific plaque in its mid section but no significant obstruction and the LAD ad some calcific plaque in its mid section but also appeared free of severe obstruction.   - Cardiac PET 3/10/21: no ischemia, mild dilatation of left ventricular cavity after Lexiscan infusion with mild reduction in EF. Even in presence of no perfusion defects possibility of underlying  triple-vessel disease cannot be ruled    - [facility] 3/22/21:  Nonobstructive CAD with patent RCA stent.  Elevated LVEDP.  2. Dyslipidemia.  3. Diabetes.  4. COPD with prior hx of tobacco use.   5. Previous right breast cancer with right arm edema.  6. GERD.  7. Mild bilateral carotid artery stenosis.    Patient with issues above was admitted tot  hospital with several days of nausea and vomiting not feeling well. She was

## 2025-03-31 NOTE — PLAN OF CARE
Patient became hypotensive to 74/41 while on diltiazem drip, remains tachycardic in afib with rvr to 120s. Will give 500cc saline bolus, 10mg PO midodrine, DC dilt drip and switch to amio bolus + infusion.

## 2025-03-31 NOTE — SIGNIFICANT EVENT
Rapid Called at 1739    Responded to RRT at 1739 for Chest Pain    Provider at bedside: Yes  Interventions ordered: Labs and EKG  Sepsis Suspected: No  Transfer to Higher Level of Care: na    Situation  Pt started to have moderate CP when ambulating and sitting on the chair, RRT was called.     Background  MI, stent, hyperlipidemia, PNA    Assessment  Alert, 2/10 CP in the sternum area, not radiating to other place, \"pain is better\" per PT. No SOB.    Intervention/Recommendation  Troponin, EKG.     Reevaluation  No distress at this time.     RRT verbally is released by primary RN.       Vitals:    03/31/25 1057   BP: (!) 92/56   Pulse: 75   Resp: 14   Temp: 97.7 °F (36.5 °C)   SpO2: 94%        Rapid Ended at 1800  RRT RN assisted with transport to accepting unit NoSoto Singer RN

## 2025-03-31 NOTE — PLAN OF CARE
Problem: Chronic Conditions and Co-morbidities  Goal: Patient's chronic conditions and co-morbidity symptoms are monitored and maintained or improved  3/31/2025 0935 by Marcie Reina RN  Outcome: Progressing  3/31/2025 0109 by Ralph Corral RN  Outcome: Progressing     Problem: Discharge Planning  Goal: Discharge to home or other facility with appropriate resources  3/31/2025 0935 by Marcie Reina RN  Outcome: Progressing  3/31/2025 0109 by Ralph Corral RN  Outcome: Progressing     Problem: Safety - Adult  Goal: Free from fall injury  3/31/2025 0935 by Marcie Reina RN  Outcome: Progressing  3/31/2025 0109 by Ralph Corral RN  Outcome: Progressing     Problem: Pain  Goal: Verbalizes/displays adequate comfort level or baseline comfort level  Outcome: Progressing  Flowsheets  Taken 3/31/2025 0300 by Ralph Corral RN  Verbalizes/displays adequate comfort level or baseline comfort level:   Encourage patient to monitor pain and request assistance   Assess pain using appropriate pain scale  Taken 3/30/2025 2220 by Ralph Corral RN  Verbalizes/displays adequate comfort level or baseline comfort level: Encourage patient to monitor pain and request assistance      "  Elena Don Patient Age: 38year old  MESSAGE:   PROCEDURE CANCEL    Procedure type: EGD Colonoscopy  Procedure date/time: 1/4  Procedure location: [address] [place] [address],[address] ([facility])  Reason for cancellation or reschedule: Changed mind  Requesting a call back to reschedule? YES    Patient looking to schedule for some time in February      WEIGHT AND HEIGHT:   Wt Readings from Last 1 Encounters:   11/25/20 101.2 kg (223 lb)     Ht Readings from Last 1 Encounters:   11/25/20 5' 3"" (1.6 m)     BMI Readings from Last 1 Encounters:   11/25/20 39.50 kg/mÂ²       ALLERGIES:  Carisoprodol, Diazepam, Diphenhydramine hcl, Iodinated diagnostic agents, Iodine, Nortriptyline, Pamelor, Venlafaxine, Valsartan, Butalbital-apap-caffeine, Gnp iodides decolorized, Levothyroxine, Sudafed, and Hydrocodone-acetaminophen  Current Outpatient Medications   Medication   â¢ bisacodyl (Dulcolax) 5 MG EC tablet   â¢ simethicone (MYLICON) 840 MG chewable tablet   â¢ electrolyte/PEG 3350 (Golytely) 236 g solution   â¢ calcium (OYST-MINOR) 500 MG tablet   â¢ Cetirizine HCl 10 MG Cap   â¢ Cyanocobalamin 5000 MCG SL Tab   â¢ Magnesium Chloride 64 MG Tab   â¢ ondansetron (ZOFRAN) 8 MG tablet   â¢ zolmitriptan (ZOMIG) 5 MG tablet   â¢ hydroxychloroquine (PLAQUENIL) 200 MG tablet     No current facility-administered medications for this visit.       PHARMACY to use:           Pharmacy preference(s) on file:   [address] [place] [address], [address] [place] [id number]  Phone: [phone] Fax: [phone]: Ok Reasoner to leave response (including medical information) on answering machine  ROUTING: Patient's physician/staff        PCP: Gail Lujan MD         INS: Payor: Chantelle Amaral / Plan: [place]  / Product Type: MEDICARE   PATIENT ADDRESS:  [address] [place] [address]  "

## 2025-03-31 NOTE — CARE COORDINATION
Care Management Initial Assessment       RUR: 11% Low RUR  Readmission? No  1st IM letter given? Yes - 3/30  1st  letter given: No     03/31/25 0832   Service Assessment   Patient Orientation Alert and Oriented;Person;Place;Situation;Self   Cognition Alert   History Provided By Patient   Primary Caregiver Self   Accompanied By/Relationship no one   Support Systems Children;Spouse/Significant Other  (Gm Herrera (Spouse)  [phone] and Valeriano Herrera  [phone])   Patient's Healthcare Decision Maker is: Patient Declined (Legal Next of Kin Remains as Decision Maker)   PCP Verified by CM Yes   Last Visit to PCP Within last 3 months   Prior Functional Level Independent in ADLs/IADLs   Current Functional Level Independent in ADLs/IADLs   Can patient return to prior living arrangement Yes   Ability to make needs known: Good   Family able to assist with home care needs: Yes   Would you like for me to discuss the discharge plan with any other family members/significant others, and if so, who? Yes  (Gm Herrera (Spouse) [phone] and Valeriano Herrera [phone])   Financial Resources Medicare   Community Resources None   CM/SW Referral Disease Management Education   Social/Functional History   Lives With Spouse  (HaywardGm enciso (Spouse) [phone])   Type of Home House   Home Layout One level   Home Access Stairs to enter with rails   Entrance Stairs - Number of Steps 4   Entrance Stairs - Rails Both   Home Equipment Cane  (nebulizer)   Active  Yes   Discharge Planning   Type of Residence House   Living Arrangements Spouse/Significant Other   Current Services Prior To Admission Durable Medical Equipment   Potential Assistance Needed N/A   Patient expects to be discharged to: House   Follow Up Appointment: Best Day/Time  Wednesday PM         The  (CM) conducted an initial meeting phone call with the patient , formally introducing themselves and clarifying their role in discharge planning and

## 2025-03-31 NOTE — ED NOTES
TRANSFER - OUT REPORT:    Verbal report given to Dionisio  on Rabia Herrera  being transferred to 2311 for routine progression of patient care       Report consisted of patient's Situation, Background, Assessment and   Recommendations(SBAR).     Information from the following report(s) Nurse Handoff Report, ED Encounter Summary, ED SBAR, MAR, Recent Results, Med Rec Status, Neuro Assessment, and Event Log was reviewed with the receiving nurse.    [place] Fall Assessment:    Presents to emergency department  because of falls (Syncope, seizure, or loss of consciousness): No  Age > 70: Yes  Altered Mental Status, Intoxication with alcohol or substance confusion (Disorientation, impaired judgment, poor safety awaremess, or inability to follow instructions): No  Impaired Mobility: Ambulates or transfers with assistive devices or assistance; Unable to ambulate or transer.: No  Nursing Judgement: No          Lines:   Peripheral IV Left;Proximal Forearm (Active)       Peripheral IV Left Forearm (Active)        Opportunity for questions and clarification was provided.      Patient transported with:  Monitor and Registered Nurse

## 2025-03-31 NOTE — PROGRESS NOTES
Re: Rivaroxaban ([facility] approved dose change has been made on this patient)    Indication: Afib  Crcl: 44 ml/min  Ordered dose: 20 mg po Daily  Pharmacy ordered dose change: 15 mg po daily for Crcl less than 50ml/min as well as drug-drug interactions with Diltiazem and Fluconazole.     Thanks,  COLTEN CHANEL formerly [facility]

## 2025-04-01 VITALS
RESPIRATION RATE: 20 BRPM | WEIGHT: 206 LBS | HEIGHT: 66 IN | TEMPERATURE: 97.9 F | BODY MASS INDEX: 33.11 KG/M2 | OXYGEN SATURATION: 97 % | HEART RATE: 97 BPM | DIASTOLIC BLOOD PRESSURE: 63 MMHG | SYSTOLIC BLOOD PRESSURE: 102 MMHG

## 2025-04-01 LAB
ALBUMIN SERPL-MCNC: 2.3 G/DL (ref 3.5–5)
ALBUMIN/GLOB SERPL: 0.6 (ref 1.1–2.2)
ALP SERPL-CCNC: 215 U/L (ref 45–117)
ALT SERPL-CCNC: 82 U/L (ref 12–78)
ANION GAP SERPL CALC-SCNC: 3 MMOL/L (ref 2–12)
AST SERPL-CCNC: 53 U/L (ref 15–37)
BASOPHILS # BLD: 0.07 K/UL (ref 0–0.1)
BASOPHILS NFR BLD: 0.7 % (ref 0–1)
BILIRUB SERPL-MCNC: 1.5 MG/DL (ref 0.2–1)
BUN SERPL-MCNC: 9 MG/DL (ref 6–20)
BUN/CREAT SERPL: 7 (ref 12–20)
CALCIUM SERPL-MCNC: 9.1 MG/DL (ref 8.5–10.1)
CHLORIDE SERPL-SCNC: 110 MMOL/L (ref 97–108)
CO2 SERPL-SCNC: 26 MMOL/L (ref 21–32)
CREAT SERPL-MCNC: 1.36 MG/DL (ref 0.55–1.02)
DIFFERENTIAL METHOD BLD: ABNORMAL
ECHO BSA: 2.09 M2
ECHO LV EF PHYSICIAN: 55 %
ECHO LV FRACTIONAL SHORTENING: 49 % (ref 28–44)
ECHO LV INTERNAL DIMENSION DIASTOLE INDEX: 1.92 CM/M2
ECHO LV INTERNAL DIMENSION DIASTOLIC: 3.9 CM (ref 3.9–5.3)
ECHO LV INTERNAL DIMENSION SYSTOLIC INDEX: 0.99 CM/M2
ECHO LV INTERNAL DIMENSION SYSTOLIC: 2 CM
ECHO LV IVSD: 1 CM (ref 0.6–0.9)
ECHO LV MASS 2D: 122.1 G (ref 67–162)
ECHO LV MASS INDEX 2D: 60.2 G/M2 (ref 43–95)
ECHO LV POSTERIOR WALL DIASTOLIC: 1 CM (ref 0.6–0.9)
ECHO LV RELATIVE WALL THICKNESS RATIO: 0.51
ECHO LVOT AREA: 2.3 CM2
ECHO LVOT DIAM: 1.7 CM
EOSINOPHIL # BLD: 0.46 K/UL (ref 0–0.4)
EOSINOPHIL NFR BLD: 4.7 % (ref 0–7)
ERYTHROCYTE [DISTWIDTH] IN BLOOD BY AUTOMATED COUNT: 15.9 % (ref 11.5–14.5)
GLOBULIN SER CALC-MCNC: 4 G/DL (ref 2–4)
GLUCOSE BLD STRIP.AUTO-MCNC: 102 MG/DL (ref 65–117)
GLUCOSE BLD STRIP.AUTO-MCNC: 107 MG/DL (ref 65–117)
GLUCOSE SERPL-MCNC: 79 MG/DL (ref 65–100)
HCT VFR BLD AUTO: 42.3 % (ref 35–47)
HGB BLD-MCNC: 13.4 G/DL (ref 11.5–16)
IMM GRANULOCYTES # BLD AUTO: 0.04 K/UL (ref 0–0.04)
IMM GRANULOCYTES NFR BLD AUTO: 0.4 % (ref 0–0.5)
LYMPHOCYTES # BLD: 2.05 K/UL (ref 0.8–3.5)
LYMPHOCYTES NFR BLD: 20.9 % (ref 12–49)
MCH RBC QN AUTO: 27.4 PG (ref 26–34)
MCHC RBC AUTO-ENTMCNC: 31.7 G/DL (ref 30–36.5)
MCV RBC AUTO: 86.5 FL (ref 80–99)
MONOCYTES # BLD: 0.97 K/UL (ref 0–1)
MONOCYTES NFR BLD: 9.9 % (ref 5–13)
NEUTS SEG # BLD: 6.21 K/UL (ref 1.8–8)
NEUTS SEG NFR BLD: 63.4 % (ref 32–75)
NRBC # BLD: 0 K/UL (ref 0–0.01)
NRBC BLD-RTO: 0 PER 100 WBC
PLATELET # BLD AUTO: 212 K/UL (ref 150–400)
PMV BLD AUTO: 12.1 FL (ref 8.9–12.9)
POTASSIUM SERPL-SCNC: 3.2 MMOL/L (ref 3.5–5.1)
PROT SERPL-MCNC: 6.3 G/DL (ref 6.4–8.2)
RBC # BLD AUTO: 4.89 M/UL (ref 3.8–5.2)
SERVICE CMNT-IMP: NORMAL
SERVICE CMNT-IMP: NORMAL
SODIUM SERPL-SCNC: 139 MMOL/L (ref 136–145)
WBC # BLD AUTO: 9.8 K/UL (ref 3.6–11)

## 2025-04-01 PROCEDURE — 2500000003 HC RX 250 WO HCPCS: Performed by: STUDENT IN AN ORGANIZED HEALTH CARE EDUCATION/TRAINING PROGRAM

## 2025-04-01 PROCEDURE — 80053 COMPREHEN METABOLIC PANEL: CPT

## 2025-04-01 PROCEDURE — 6370000000 HC RX 637 (ALT 250 FOR IP): Performed by: INTERNAL MEDICINE

## 2025-04-01 PROCEDURE — 6370000000 HC RX 637 (ALT 250 FOR IP): Performed by: NURSE PRACTITIONER

## 2025-04-01 PROCEDURE — 6370000000 HC RX 637 (ALT 250 FOR IP): Performed by: STUDENT IN AN ORGANIZED HEALTH CARE EDUCATION/TRAINING PROGRAM

## 2025-04-01 PROCEDURE — 82962 GLUCOSE BLOOD TEST: CPT

## 2025-04-01 PROCEDURE — 6360000002 HC RX W HCPCS: Performed by: STUDENT IN AN ORGANIZED HEALTH CARE EDUCATION/TRAINING PROGRAM

## 2025-04-01 PROCEDURE — 94640 AIRWAY INHALATION TREATMENT: CPT

## 2025-04-01 PROCEDURE — 36415 COLL VENOUS BLD VENIPUNCTURE: CPT

## 2025-04-01 PROCEDURE — 85025 COMPLETE CBC W/AUTO DIFF WBC: CPT

## 2025-04-01 RX ORDER — POTASSIUM CHLORIDE 1500 MG/1
40 TABLET, EXTENDED RELEASE ORAL ONCE
Status: COMPLETED | OUTPATIENT
Start: 2025-04-01 | End: 2025-04-01

## 2025-04-01 RX ORDER — METOPROLOL TARTRATE 25 MG/1
25 TABLET, FILM COATED ORAL 2 TIMES DAILY
Qty: 60 TABLET | Refills: 1 | Status: SHIPPED | OUTPATIENT
Start: 2025-04-01

## 2025-04-01 RX ORDER — POLYETHYLENE GLYCOL 3350 17 G/17G
17 POWDER, FOR SOLUTION ORAL ONCE
Status: COMPLETED | OUTPATIENT
Start: 2025-04-01 | End: 2025-04-01

## 2025-04-01 RX ADMIN — AMOXICILLIN AND CLAVULANATE POTASSIUM 1 TABLET: 875; 125 TABLET, FILM COATED ORAL at 09:02

## 2025-04-01 RX ADMIN — ARFORMOTEROL TARTRATE 15 MCG: 15 SOLUTION RESPIRATORY (INHALATION) at 08:17

## 2025-04-01 RX ADMIN — METOPROLOL TARTRATE 25 MG: 25 TABLET, FILM COATED ORAL at 09:03

## 2025-04-01 RX ADMIN — POLYETHYLENE GLYCOL 3350 17 G: 17 POWDER, FOR SOLUTION ORAL at 10:17

## 2025-04-01 RX ADMIN — INSULIN HUMAN 35 UNITS: 100 INJECTION, SOLUTION PARENTERAL at 06:55

## 2025-04-01 RX ADMIN — GABAPENTIN 300 MG: 300 CAPSULE ORAL at 09:03

## 2025-04-01 RX ADMIN — DULOXETINE HYDROCHLORIDE 60 MG: 30 CAPSULE, DELAYED RELEASE ORAL at 09:02

## 2025-04-01 RX ADMIN — IPRATROPIUM BROMIDE 0.5 MG: 0.5 SOLUTION RESPIRATORY (INHALATION) at 08:12

## 2025-04-01 RX ADMIN — POTASSIUM CHLORIDE 40 MEQ: 1500 TABLET, EXTENDED RELEASE ORAL at 10:17

## 2025-04-01 RX ADMIN — SODIUM CHLORIDE, PRESERVATIVE FREE 10 ML: 5 INJECTION INTRAVENOUS at 09:04

## 2025-04-01 RX ADMIN — ASPIRIN 81 MG: 81 TABLET, CHEWABLE ORAL at 09:02

## 2025-04-01 RX ADMIN — Medication 1 CAPSULE: at 09:02

## 2025-04-01 RX ADMIN — POTASSIUM BICARBONATE 40 MEQ: 782 TABLET, EFFERVESCENT ORAL at 07:57

## 2025-04-01 RX ADMIN — BUDESONIDE 500 MCG: 0.5 INHALANT RESPIRATORY (INHALATION) at 08:17

## 2025-04-01 ASSESSMENT — PAIN DESCRIPTION - LOCATION: LOCATION: ABDOMEN

## 2025-04-01 ASSESSMENT — PAIN SCALES - GENERAL
PAINLEVEL_OUTOF10: 0
PAINLEVEL_OUTOF10: 2

## 2025-04-01 ASSESSMENT — PAIN DESCRIPTION - DESCRIPTORS: DESCRIPTORS: ACHING

## 2025-04-01 ASSESSMENT — PAIN DESCRIPTION - PAIN TYPE: TYPE: OTHER (COMMENT)

## 2025-04-01 NOTE — DISCHARGE SUMMARY
DISCHARGE SUMMARY              Name: Rabia Herrera  [id number]  YOB: 1955 (Age: 70 y.o.)   Date of Admission: 3/30/2025  Date of Discharge: 4/1/2025  Attending Physician: Darlin att. providers found    DISCHARGE DIAGNOSIS:    New onset A-fib-with rapid ventricular rate  Acute onset nausea and vomiting  Possibly gastroenteritis  Likely dehydration due to above  Hypokalemia  Near syncope due to above  Possible UTI  Elevated transaminases, Likely due to hepatic steatosis + alcohol use  ADARSH likely due to vomiting  Hyponatremia likely due to dehydration, resolved  Lactic acid elevation  Known case of diabetes on insulin at home  Known case of COPD on inhalers at home  Ex-smoker  History of breast cancer status postmastectomy on the right side  Drinks alcohol almost 1 drink every day  Obesity BMI 33.25  Known case of peripheral neuropathy on gabapentin      CONSULTATIONS:  IP CONSULT TO CARDIOLOGY  IP CONSULT TO GI  IP CONSULT TO PHARMACY    Excerpted HPI from H&P of Maggie Vo MD:  Rabia Herrera is a 70 y.o.  female with PMHx significant as below presented to the ED for evaluation after patient started having increased nausea and vomiting last 4 days.  She mentioned that she was apparently all right until last Wednesday when she started having increased nausea and vomiting which was-more food content and bile acid.  Later on she was not able to tolerate any oral feeds, however was doing liquid to keep her hydrated.  This was followed by episodes of loose bowel movement-about single episode every day.  She had been having occasional abdominal pain which is generalized-used to have abdominal pain just before vomiting and with the episode of vomiting the pain got better.  About 7 out of 10 intensity, nonradiating.  Patient had been having chills however no fever no sweating.  Occasional burning in the urine however no change in color or volume of the urine.     This morning

## 2025-04-01 NOTE — PROGRESS NOTES
Hospitalist Progress Note    NAME:   Rabia Herrera   : 1955   MRN: [id number]     Date/Time: 3/31/2025 8:36 PM  Patient PCP: Saurav Harkins MD    Estimated discharge date:  Barriers:       Assessment / Plan:    New onset A-fib-with rapid ventricular rate  Likely in the background of acute dehydration from nausea and vomiting  [id number] score 4  Continue Xarelto continue metoprolol, waiting for echocardiogram, follow-up cardiology recommendation, stop diltiazem drip     Acute onset nausea and vomiting  Possibly gastroenteritis  Likely dehydration due to above  Hypokalemia  Near syncope due to above  CT abdomen and pelvis with and without IV contrast done on 3/30/2025-gastritis, constipation  Patient received fluid resuscitation in the ED  No significant lecture abnormality today, stop IV fluid     Possible UTI  Urinalysis - small blood , nitrite + , bacteria +, yeast + , granular cast   Received Diflucan in the ER, urine culture positive for Enterococcus, stop with Rocephin started on Augmentin         Elevated transaminases  Likely due to hepatic steatosis  Patient currently on statin  Alkaline phosphatase 282    ALP 74  Bilirubin 2.8  Ultrasound abdomen done on 3/30/2025-hepatic steatosis, gallbladder and bile duct within normal limit  -Drinks alcohol on daily basis, follow GI recommendation, repeat CMP tomorrow, hold statin     ADARSH likely due to vomiting  Hyponatremia likely due to dehydration  Lactic acid elevation  Creatinine on arrival 1.72-previous known baseline 0.8- in    Improving today creatinine is 1.3, hyponatremia resolved       Known case of diabetes on insulin at home  Wi  Continue insulin regular 35 units twice daily which is her home dose  Diabetic diet  Hypoglycemic treatment order in place     Known case of COPD on inhalers at home  Ex-smoker  Continue home inhalers  Not on supplemental oxygen     History of breast cancer status postmastectomy on the right  3, normal speech,   Psych:   Good insight. Not anxious nor agitated  Skin:  No rashes.  No jaundice    Reviewed most current lab test results and cultures  YES  Reviewed most current radiology test results   YES  Review and summation of old records today    NO  Reviewed patient's current orders and MAR    YES  PMH/SH reviewed - no change compared to H&P    Procedures: see electronic medical records for all procedures/Xrays and details which were not copied into this note but were reviewed prior to creation of Plan.      LABS:  I reviewed today's most current labs and imaging studies.  Pertinent labs include:  Recent Labs     03/30/25  1330 03/31/25  0334   WBC 9.1 8.0   HGB 16.1* 13.1   HCT 50.1* 41.7    197     Recent Labs     03/30/25  1330 03/30/25  1833 03/31/25  0334   * 140 137   K 3.4* 3.2* 3.7    113* 108   CO2 24 21 24   GLUCOSE 310* 187* 217*   BUN 14 11 11   CREATININE 1.72* 1.12* 1.36*   CALCIUM 9.6 6.8* 8.5   MG  --  1.7  --    BILITOT 2.8* 1.8* 1.4*   AST 74* 51* 61*   * 82* 90*       Signed: Chadwick Lanza MD

## 2025-04-01 NOTE — PROGRESS NOTES
End of Shift Note    Bedside shift change report given to Yecenia TOTH (oncoming nurse) by Ralph Corral RN (offgoing nurse).  Report included the following information SBAR, Kardex, Intake/Output, MAR, Recent Results, Med Rec Status, and Cardiac Rhythm a fib    Shift worked:  1900--0730     Shift summary and any significant changes:    Doing well,had acalm night with no issues     Concerns for physician to address:       Zone phone for oncoming shift:          Activity:  Level of Assistance: Independent  Number times ambulated in hallways past shift: 0  Number of times OOB to chair past shift: 0    Cardiac:   Cardiac Monitoring: Yes      Cardiac Rhythm: Atrial fib    Access:  Current line(s): PIV     Genitourinary:   Urinary Status: Voiding, Bathroom privileges    Respiratory:   O2 Device: None (Room air)  Chronic home O2 use?: NO  Incentive spirometer at bedside: YES    GI:  Last BM (including prior to admit): 03/30/25  Current diet:  ADULT DIET; Regular; 4 carb choices (60 gm/meal)  Passing flatus: YES    Pain Management:   Patient states pain is manageable on current regimen: YES    Skin:  Jake Scale Score: 23  Interventions: Wound Offloading (Prevention Methods): Bed, pressure redistribution/air    Patient Safety:  Fall Risk: Nursing Judgement-Fall Risk High(Add Comments): Yes  Fall Risk Interventions  Nursing Judgement-Fall Risk High(Add Comments): Yes  Toilet Every 2 Hours-In Advance of Need: Yes  Hourly Visual Checks: In bed  Fall Visual Posted: Socks  Room Door Open: Yes  Alarm On: Bed  Patient Moved Closer to Nursing Station: No    Active Consults:   IP CONSULT TO CARDIOLOGY  IP CONSULT TO GI  IP CONSULT TO PHARMACY    Length of Stay:  Expected LOS: 3  Actual LOS: 2    Ralph Corral RN

## 2025-04-01 NOTE — PROGRESS NOTES
0730  Bedside and verbal report from Ralph Corral RN  0800  Assessment completed.  0930  Dr. Martins here to see patient. For discharge today.  1000  Gail Goodman NP here to see patient.   1030  Assisted to bathroom, then back to bed. Ordered KCL and glycolax given as ordered.   1045  Discharge instructions reviewed with patient and family members. Opportunity for questions/concerns. PIV's removed.   1115  Discharged via wheelchair, accompanied by volunteer and family members.

## 2025-04-01 NOTE — CARE COORDINATION
Transition of Care Plan:    RUR: RUR: 11% Low RUR  Prior Level of Functioning: Independent in ADLs/IADLs  Disposition: home with follow up  SITA: 4/1  If SNF or IPR: Date FOC offered: na  Date FOC received:   Accepting facility:   Date authorization started with reference number:   Date authorization received and expires:   Follow up appointments: pcp/specialist   DME needed: none  Transportation at discharge: (Gm Herrera (Spouse) [phone])  IM/IMM Medicare/ letter given: IM given 03/31  Is patient a  and connected with [facility]? na   If yes, was  transfer form completed and [facility] notified? na  Caregiver Contact: (Gm Herrera (Spouse) [phone])  Discharge Caregiver contacted prior to discharge? contacted  Care Conference needed? no  Barriers to discharge: none        The  (CM) noted no futher needs or concerns. The patient agrees with the discharge plan. The patient's family will provide  transportation upon discharge. The primary registered nurse (RN) has been informed that the patient is cleared from the case management perspective.     04/01/25 1115   Services At/After Discharge   Transition of Care Consult (CM Consult) Discharge Planning   Services At/After Discharge None   [place] Resource Information Provided? No   Mode of Transport at Discharge Other (see comment)  (Transportation at discharge: (Gm Herrera (Spouse) [phone]))   Confirm Follow Up Transport Self           Jocelyn Winkler RN  Case Management  [phone]

## 2025-04-01 NOTE — PROGRESS NOTES
[facility]     Progress Note      4/1/2025 9:42 AM  NAME: Rabia Herrera   MRN:  [id number]   Admit Diagnosis: Lactic acidosis [E87.20]  Hyperbilirubinemia [E80.6]  A-fib (HCC) [I48.91]  Tachycardia [R00.0]  Transaminitis [R74.01]  ADARSH (acute kidney injury) [N17.9]  Urinary tract infection with hematuria, site unspecified [N39.0, R31.9]  Atrial fibrillation, unspecified type (HCC) [I48.91]  Acute gastritis without hemorrhage, unspecified gastritis type [K29.00]  Diarrhea, unspecified type [R19.7]  Nausea and vomiting, unspecified vomiting type [R11.2]     Problem List:     --Atrial fibrillation with rapid ventricular response   Rate controlled  New onset  Previously not on OAC now on Eliquis  CAD  Status post PCI to RCA  COPD  DM2  HTN  HLD  Mild Carotid Artery Stenosis  R Breast Cancer Hx  GERD  Dehydration  Hypokalemia      Full Code  -t+e/-s          Assessment/Plan:     --Agree with Xarelto 15 mg daily  Continue po metoprolol  Echocardiogram   Discussed with patient rhythm treatment strategies. Patient is interested in an atrial fibrillation ablation. Risks benefits and alternatives discussed with patient at length. We will set this up as outpatient procedure. CHADS_VASC is 4. Will need indefinite anticoagulation.       4/1/ update: Replete potassium with Kloc Con 40 once today. Hopefully can add back Spirinolactone tomorrow. She is concerned about Xarelto pricing. I told her that there are patient assistance programs. I gave her my business card and asked her to follow up with Dr Moreno in the next few weeks.        [x]       High complexity decision making was performed in this patient at high risk for decompensation with multiple organ involvement.    We discussed the expected course, resolution and complications of the diagnoses in detail.  Medication risk, benefits, costs, interactions, and alternatives were discussed as indicated.  I advised him to contact the office if   --      No results for input(s): \"CPK\" in the last 72 hours.    Invalid input(s): \"CPKMB\", \"CKNDX\", \"TROIQ\"  Lab Results   Component Value Date/Time    CHOL 68 03/30/2025 06:33 PM    HDL 9 03/30/2025 06:33 PM    LDL 25.8 03/30/2025 06:33 PM       Recent Labs     03/30/25  1833 03/31/25  0334 04/01/25  0317   GLOB 2.8 3.8 4.0     No results for input(s): \"PH\", \"PCO2\", \"PO2\" in the last 72 hours.    Medications Personally Reviewed:    Current Facility-Administered Medications   Medication Dose Route Frequency    insulin lispro (HUMALOG,ADMELOG) injection vial 0-16 Units  0-16 Units SubCUTAneous 4x Daily AC & HS    rivaroxaban (XARELTO) tablet 15 mg  15 mg Oral Daily    guaiFENesin-dextromethorphan (ROBITUSSIN DM) 100-10 MG/5ML syrup 5 mL  5 mL Oral Q4H PRN    lactobacillus (CULTURELLE) capsule 1 capsule  1 capsule Oral Daily with breakfast    amoxicillin-clavulanate (AUGMENTIN) 875-125 MG per tablet 1 tablet  1 tablet Oral 2 times per day    sodium chloride flush 0.9 % injection 5-40 mL  5-40 mL IntraVENous 2 times per day    sodium chloride flush 0.9 % injection 5-40 mL  5-40 mL IntraVENous PRN    0.9 % sodium chloride infusion   IntraVENous PRN    potassium chloride (KLOR-CON M) extended release tablet 40 mEq  40 mEq Oral PRN    Or    potassium bicarb-citric acid (EFFER-K) effervescent tablet 40 mEq  40 mEq Oral PRN    Or    potassium chloride 10 mEq/100 mL IVPB (Peripheral Line)  10 mEq IntraVENous PRN    magnesium sulfate 2000 mg in 50 mL IVPB premix  2,000 mg IntraVENous PRN    ondansetron (ZOFRAN-ODT) disintegrating tablet 4 mg  4 mg Oral Q8H PRN    Or    ondansetron (ZOFRAN) injection 4 mg  4 mg IntraVENous Q6H PRN    polyethylene glycol (GLYCOLAX) packet 17 g  17 g Oral Daily PRN    acetaminophen (TYLENOL) tablet 650 mg  650 mg Oral Q6H PRN    Or    acetaminophen (TYLENOL) suppository 650 mg  650 mg Rectal Q6H PRN    albuterol (PROVENTIL) (2.5 MG/3ML) 0.083% nebulizer solution 2.5 mg  2.5 mg Nebulization Q6H

## 2025-04-01 NOTE — PROGRESS NOTES
End of Shift Note    Bedside shift change report given to JANEY Rosas  (oncoming nurse) by Marcie Reina RN (offgoing nurse).  Report included the following information SBAR, Intake/Output, and Cardiac Rhythm A-Fib    Shift worked:  8072-8084     Shift summary and any significant changes:     Amio gtt off    1735: Pt up to chair for dinner, patient c/o chest pain (NOT radiating). Trops  (-) and EKG (A-Fib) ordered.    Patient back in bed not c/o pain at shift change    IVF continued   Concerns for physician to address:  See above     Zone phone for oncoming shift:          Activity:  Level of Assistance: Independent  Number times ambulated in hallways past shift: 0  Number of times OOB to chair past shift: 1    Cardiac:   Cardiac Monitoring: Yes      Cardiac Rhythm: Atrial fib    Access:  Current line(s): PIV     Genitourinary:   Urinary Status: Voiding, External catheter    Respiratory:   O2 Device: None (Room air)  Chronic home O2 use?: YES  Incentive spirometer at bedside: YES    GI:  Last BM (including prior to admit): 03/30/25  Current diet:  ADULT DIET; Regular; 4 carb choices (60 gm/meal)  Passing flatus: YES    Pain Management:   Patient states pain is manageable on current regimen: YES    Skin:  Jake Scale Score: 22  Interventions: Wound Offloading (Prevention Methods): Bed, pressure redistribution/air    Patient Safety:  Fall Risk: Nursing Judgement-Fall Risk High(Add Comments): Yes  Fall Risk Interventions  Nursing Judgement-Fall Risk High(Add Comments): Yes  Toilet Every 2 Hours-In Advance of Need: Yes  Hourly Visual Checks: In bed, Quiet  Fall Visual Posted: Socks  Room Door Open: Yes  Alarm On: Bed  Patient Moved Closer to Nursing Station: No    Active Consults:   IP CONSULT TO CARDIOLOGY  IP CONSULT TO GI  IP CONSULT TO PHARMACY    Length of Stay:  Expected LOS: 3  Actual LOS: 1    Marcie Reina RN

## 2025-04-02 LAB
BACTERIA SPEC CULT: ABNORMAL
BACTERIA SPEC CULT: ABNORMAL
CC UR VC: ABNORMAL
SERVICE CMNT-IMP: ABNORMAL

## 2025-04-03 LAB
EKG DIAGNOSIS: NORMAL
EKG Q-T INTERVAL: 308 MS
EKG QRS DURATION: 68 MS
EKG QTC CALCULATION (BAZETT): 438 MS
EKG R AXIS: -62 DEGREES
EKG T AXIS: 59 DEGREES
EKG VENTRICULAR RATE: 122 BPM

## 2025-04-05 LAB
BACTERIA SPEC CULT: NORMAL
BACTERIA SPEC CULT: NORMAL
SERVICE CMNT-IMP: NORMAL
SERVICE CMNT-IMP: NORMAL

## 2025-04-07 LAB
EKG DIAGNOSIS: NORMAL
EKG Q-T INTERVAL: 400 MS
EKG QRS DURATION: 72 MS
EKG QTC CALCULATION (BAZETT): 486 MS
EKG R AXIS: -50 DEGREES
EKG T AXIS: 45 DEGREES
EKG VENTRICULAR RATE: 89 BPM

## 2025-04-15 NOTE — PROGRESS NOTES
Physician Progress Note      PATIENT:               KAY MERCADO  [facility] #:                  [id number]  :                       1955  ADMIT DATE:       3/30/2025 1:12 PM  DISCH DATE:        2025 11:19 AM  RESPONDING  PROVIDER #:        Brad Martins MD          QUERY TEXT:    The patient admitted with nausea and vomiting. Colitis is documented in the DS   by IM on .  Please specify the type of colitis such as:    The clinical indicators include:  The patient's clinical indicators include.  -pt Age: F70Y, she had ADARSH and dehydration, Hypokalemia.  -H&P by IM on  stated as Possibly gastroenteritis noted.  -DS by IM on  under Acute onset nausea and vomiting Possibly   gastroenteritis.  -Lactic acid - 2.37mmol/L BP -82/46, 74/41, 92/56  pt on IV pantoprazole, Oral lactobacillus, Consult to Gastroenterology    Thank you  Roni Flannery [facility] CDS  Options provided:  -- Acute Ischemic Colitis  -- Colitis related to other etiology, Please document other etiology.  -- Other - I will add my own diagnosis  -- Disagree - Not applicable / Not valid  -- Disagree - Clinically unable to determine / Unknown  -- Refer to Clinical Documentation Reviewer    PROVIDER RESPONSE TEXT:    Possible gastroenteritis, unspecified    Query created by: Roni Flannery on 2025 7:38 AM      Electronically signed by:  Brad Martins MD 4/15/2025 11:11 AM

## (undated) DEVICE — SYR 5ML 1/5 GRAD LL NSAF LF --

## (undated) DEVICE — SYR LR LCK 1ML GRAD NSAF 30ML --

## (undated) DEVICE — BLUNTFILL WITH FILTER: Brand: MONOJECT

## (undated) DEVICE — TOWEL SURG W17XL27IN STD BLU COT NONFENESTRATED PREWASHED

## (undated) DEVICE — SURGICAL PROCEDURE PACK CATRCT CUST

## (undated) DEVICE — SYR 10ML LUER LOK 1/5ML GRAD --

## (undated) DEVICE — SYR 3ML LL TIP 1/10ML GRAD --

## (undated) DEVICE — CHANG HYDRO-DISSECT CANNULA 27GA: Brand: OPHTHALMIC CANNULA

## (undated) DEVICE — GLOVE ORANGE PI 7   MSG9070

## (undated) DEVICE — SYRINGE INSULIN 1ML LUERSLIP TIP W/O SFTY U100 BLISTER PK

## (undated) DEVICE — SOLUTION IRRIG 500ML STRL H2O NONPYROGENIC

## (undated) DEVICE — THE MONARCH® "D" CARTRIDGE IS A SINGLE-USE POLYPROPYLENE CARTRIDGE FOR POSTERIOR CHAMBER IOL DELIVERY: Brand: MONARCH® III